# Patient Record
Sex: MALE | Race: WHITE | HISPANIC OR LATINO | Employment: OTHER | URBAN - METROPOLITAN AREA
[De-identification: names, ages, dates, MRNs, and addresses within clinical notes are randomized per-mention and may not be internally consistent; named-entity substitution may affect disease eponyms.]

---

## 2017-03-17 ENCOUNTER — APPOINTMENT (OUTPATIENT)
Dept: RADIOLOGY | Facility: MEDICAL CENTER | Age: 72
DRG: 603 | End: 2017-03-17
Attending: EMERGENCY MEDICINE
Payer: MEDICARE

## 2017-03-17 ENCOUNTER — HOSPITAL ENCOUNTER (INPATIENT)
Facility: MEDICAL CENTER | Age: 72
LOS: 2 days | DRG: 603 | End: 2017-03-19
Attending: EMERGENCY MEDICINE | Admitting: INTERNAL MEDICINE
Payer: MEDICARE

## 2017-03-17 ENCOUNTER — HOSPITAL ENCOUNTER (EMERGENCY)
Facility: MEDICAL CENTER | Age: 72
End: 2017-03-17
Payer: MEDICARE

## 2017-03-17 ENCOUNTER — RESOLUTE PROFESSIONAL BILLING HOSPITAL PROF FEE (OUTPATIENT)
Dept: HOSPITALIST | Facility: MEDICAL CENTER | Age: 72
End: 2017-03-17
Payer: MEDICARE

## 2017-03-17 VITALS
HEART RATE: 88 BPM | SYSTOLIC BLOOD PRESSURE: 169 MMHG | TEMPERATURE: 98.8 F | RESPIRATION RATE: 16 BRPM | OXYGEN SATURATION: 94 % | DIASTOLIC BLOOD PRESSURE: 107 MMHG

## 2017-03-17 PROBLEM — L03.119 CELLULITIS OF WRIST: Status: ACTIVE | Noted: 2017-03-17

## 2017-03-17 LAB
ALBUMIN SERPL BCP-MCNC: 4.3 G/DL (ref 3.2–4.9)
ALBUMIN/GLOB SERPL: 1.5 G/DL
ALP SERPL-CCNC: 96 U/L (ref 30–99)
ALT SERPL-CCNC: 28 U/L (ref 2–50)
ANION GAP SERPL CALC-SCNC: 7 MMOL/L (ref 0–11.9)
AST SERPL-CCNC: 27 U/L (ref 12–45)
BASOPHILS # BLD AUTO: 0.5 % (ref 0–1.8)
BASOPHILS # BLD: 0.04 K/UL (ref 0–0.12)
BILIRUB SERPL-MCNC: 0.9 MG/DL (ref 0.1–1.5)
BUN SERPL-MCNC: 19 MG/DL (ref 8–22)
CALCIUM SERPL-MCNC: 9.5 MG/DL (ref 8.4–10.2)
CHLORIDE SERPL-SCNC: 101 MMOL/L (ref 96–112)
CO2 SERPL-SCNC: 30 MMOL/L (ref 20–33)
CREAT SERPL-MCNC: 0.89 MG/DL (ref 0.5–1.4)
CRP SERPL HS-MCNC: 0.22 MG/DL (ref 0–0.75)
EOSINOPHIL # BLD AUTO: 0.08 K/UL (ref 0–0.51)
EOSINOPHIL NFR BLD: 1 % (ref 0–6.9)
ERYTHROCYTE [DISTWIDTH] IN BLOOD BY AUTOMATED COUNT: 41.5 FL (ref 35.9–50)
ERYTHROCYTE [SEDIMENTATION RATE] IN BLOOD BY WESTERGREN METHOD: 7 MM/HOUR (ref 0–20)
GFR SERPL CREATININE-BSD FRML MDRD: >60 ML/MIN/1.73 M 2
GLOBULIN SER CALC-MCNC: 2.9 G/DL (ref 1.9–3.5)
GLUCOSE SERPL-MCNC: 99 MG/DL (ref 65–99)
HCT VFR BLD AUTO: 50.2 % (ref 42–52)
HGB BLD-MCNC: 17.4 G/DL (ref 14–18)
IMM GRANULOCYTES # BLD AUTO: 0.03 K/UL (ref 0–0.11)
IMM GRANULOCYTES NFR BLD AUTO: 0.4 % (ref 0–0.9)
LYMPHOCYTES # BLD AUTO: 1.55 K/UL (ref 1–4.8)
LYMPHOCYTES NFR BLD: 20.3 % (ref 22–41)
MCH RBC QN AUTO: 31.6 PG (ref 27–33)
MCHC RBC AUTO-ENTMCNC: 34.7 G/DL (ref 33.7–35.3)
MCV RBC AUTO: 91.3 FL (ref 81.4–97.8)
MONOCYTES # BLD AUTO: 0.64 K/UL (ref 0–0.85)
MONOCYTES NFR BLD AUTO: 8.4 % (ref 0–13.4)
NEUTROPHILS # BLD AUTO: 5.29 K/UL (ref 1.82–7.42)
NEUTROPHILS NFR BLD: 69.4 % (ref 44–72)
NRBC # BLD AUTO: 0 K/UL
NRBC BLD AUTO-RTO: 0 /100 WBC
PLATELET # BLD AUTO: 140 K/UL (ref 164–446)
PMV BLD AUTO: 11.6 FL (ref 9–12.9)
POTASSIUM SERPL-SCNC: 3.9 MMOL/L (ref 3.6–5.5)
PROT SERPL-MCNC: 7.2 G/DL (ref 6–8.2)
RBC # BLD AUTO: 5.5 M/UL (ref 4.7–6.1)
SODIUM SERPL-SCNC: 138 MMOL/L (ref 135–145)
URATE SERPL-MCNC: 4.8 MG/DL (ref 2.5–8.3)
WBC # BLD AUTO: 7.6 K/UL (ref 4.8–10.8)

## 2017-03-17 PROCEDURE — 700111 HCHG RX REV CODE 636 W/ 250 OVERRIDE (IP): Performed by: EMERGENCY MEDICINE

## 2017-03-17 PROCEDURE — 87040 BLOOD CULTURE FOR BACTERIA: CPT

## 2017-03-17 PROCEDURE — A9579 GAD-BASE MR CONTRAST NOS,1ML: HCPCS | Performed by: EMERGENCY MEDICINE

## 2017-03-17 PROCEDURE — 73110 X-RAY EXAM OF WRIST: CPT | Mod: LT

## 2017-03-17 PROCEDURE — 86140 C-REACTIVE PROTEIN: CPT

## 2017-03-17 PROCEDURE — 302449 STATCHG TRIAGE ONLY (STATISTIC)

## 2017-03-17 PROCEDURE — 770006 HCHG ROOM/CARE - MED/SURG/GYN SEMI*

## 2017-03-17 PROCEDURE — 700117 HCHG RX CONTRAST REV CODE 255: Performed by: EMERGENCY MEDICINE

## 2017-03-17 PROCEDURE — 700111 HCHG RX REV CODE 636 W/ 250 OVERRIDE (IP)

## 2017-03-17 PROCEDURE — 84550 ASSAY OF BLOOD/URIC ACID: CPT

## 2017-03-17 PROCEDURE — 73223 MRI JOINT UPR EXTR W/O&W/DYE: CPT | Mod: LT

## 2017-03-17 PROCEDURE — 85025 COMPLETE CBC W/AUTO DIFF WBC: CPT

## 2017-03-17 PROCEDURE — 36415 COLL VENOUS BLD VENIPUNCTURE: CPT

## 2017-03-17 PROCEDURE — 96365 THER/PROPH/DIAG IV INF INIT: CPT

## 2017-03-17 PROCEDURE — 700105 HCHG RX REV CODE 258

## 2017-03-17 PROCEDURE — 85652 RBC SED RATE AUTOMATED: CPT

## 2017-03-17 PROCEDURE — 99285 EMERGENCY DEPT VISIT HI MDM: CPT

## 2017-03-17 PROCEDURE — 99223 1ST HOSP IP/OBS HIGH 75: CPT | Mod: AI | Performed by: INTERNAL MEDICINE

## 2017-03-17 PROCEDURE — 80053 COMPREHEN METABOLIC PANEL: CPT

## 2017-03-17 PROCEDURE — 96375 TX/PRO/DX INJ NEW DRUG ADDON: CPT

## 2017-03-17 RX ORDER — ACETAMINOPHEN 325 MG/1
650 TABLET ORAL EVERY 6 HOURS PRN
Status: DISCONTINUED | OUTPATIENT
Start: 2017-03-17 | End: 2017-03-19 | Stop reason: HOSPADM

## 2017-03-17 RX ORDER — AMPICILLIN AND SULBACTAM 2; 1 G/1; G/1
3 INJECTION, POWDER, FOR SOLUTION INTRAMUSCULAR; INTRAVENOUS ONCE
Status: COMPLETED | OUTPATIENT
Start: 2017-03-17 | End: 2017-03-17

## 2017-03-17 RX ORDER — KETOROLAC TROMETHAMINE 30 MG/ML
15 INJECTION, SOLUTION INTRAMUSCULAR; INTRAVENOUS ONCE
Status: COMPLETED | OUTPATIENT
Start: 2017-03-17 | End: 2017-03-17

## 2017-03-17 RX ORDER — LORAZEPAM 2 MG/ML
0.5 INJECTION INTRAMUSCULAR EVERY 6 HOURS PRN
Status: DISCONTINUED | OUTPATIENT
Start: 2017-03-17 | End: 2017-03-19 | Stop reason: HOSPADM

## 2017-03-17 RX ORDER — ONDANSETRON 4 MG/1
4 TABLET, ORALLY DISINTEGRATING ORAL EVERY 4 HOURS PRN
Status: DISCONTINUED | OUTPATIENT
Start: 2017-03-17 | End: 2017-03-19 | Stop reason: HOSPADM

## 2017-03-17 RX ORDER — LORAZEPAM 1 MG/1
0.5 TABLET ORAL EVERY 6 HOURS PRN
Status: DISCONTINUED | OUTPATIENT
Start: 2017-03-17 | End: 2017-03-19 | Stop reason: HOSPADM

## 2017-03-17 RX ORDER — TEMAZEPAM 15 MG/1
15 CAPSULE ORAL NIGHTLY PRN
Status: DISCONTINUED | OUTPATIENT
Start: 2017-03-17 | End: 2017-03-19 | Stop reason: HOSPADM

## 2017-03-17 RX ORDER — ONDANSETRON 2 MG/ML
4 INJECTION INTRAMUSCULAR; INTRAVENOUS EVERY 4 HOURS PRN
Status: DISCONTINUED | OUTPATIENT
Start: 2017-03-17 | End: 2017-03-19 | Stop reason: HOSPADM

## 2017-03-17 RX ADMIN — AMPICILLIN SODIUM AND SULBACTAM SODIUM 3 G: 2; 1 INJECTION, POWDER, FOR SOLUTION INTRAMUSCULAR; INTRAVENOUS at 17:39

## 2017-03-17 RX ADMIN — VANCOMYCIN HYDROCHLORIDE: 1 INJECTION, POWDER, LYOPHILIZED, FOR SOLUTION INTRAVENOUS at 22:53

## 2017-03-17 RX ADMIN — KETOROLAC TROMETHAMINE 15 MG: 30 INJECTION, SOLUTION INTRAMUSCULAR; INTRAVENOUS at 18:14

## 2017-03-17 RX ADMIN — GADODIAMIDE 20 ML: 287 INJECTION INTRAVENOUS at 19:09

## 2017-03-17 ASSESSMENT — PATIENT HEALTH QUESTIONNAIRE - PHQ9
SUM OF ALL RESPONSES TO PHQ QUESTIONS 1-9: 0
1. LITTLE INTEREST OR PLEASURE IN DOING THINGS: NOT AT ALL
SUM OF ALL RESPONSES TO PHQ9 QUESTIONS 1 AND 2: 0
2. FEELING DOWN, DEPRESSED, IRRITABLE, OR HOPELESS: NOT AT ALL

## 2017-03-17 ASSESSMENT — LIFESTYLE VARIABLES
DO YOU DRINK ALCOHOL: NO
ALCOHOL_USE: NO
EVER_SMOKED: NEVER

## 2017-03-17 ASSESSMENT — PAIN SCALES - GENERAL
PAINLEVEL_OUTOF10: 8
PAINLEVEL_OUTOF10: 2
PAINLEVEL_OUTOF10: 6
PAINLEVEL_OUTOF10: 5
PAINLEVEL_OUTOF10: 1

## 2017-03-17 NOTE — ED NOTES
Blood samples have been collected while establishing IV access without any complications and transferred to lab.

## 2017-03-17 NOTE — IP AVS SNAPSHOT
3/19/2017          Andry Calles  [address] [facility]  [place] [address]    Dear Andry:    [facility] wants to ensure your discharge home is safe and you or your loved ones have had all your questions answered regarding your care after you leave the hospital.    You may receive a telephone call within two days of your discharge.  This call is to make certain you understand your discharge instructions as well as ensure we provided you with the best care possible during your stay with us.     The call will only last approximately 3-5 minutes and will be done by a nurse.    Once again, we want to ensure your discharge home is safe and that you have a clear understanding of any next steps in your care.  If you have any questions or concerns, please do not hesitate to contact us, we are here for you.  Thank you for choosing [facility] for your healthcare needs.    Sincerely,    Reji Medley    [facility]

## 2017-03-17 NOTE — ED PROVIDER NOTES
"ED Provider Note    CHIEF COMPLAINT  Chief Complaint   Patient presents with   • Hand Pain   • Wrist Pain       HPI  Andry Calles is a 71 y.o. male who presents for evaluation of left hand pain. The patient reports that yesterday he developed a \"twinge \"of pain on the dorsal aspect of the wrist on the left side. He does not think he recall any direct blood penetrating trauma. No recent scratches and abrasions or puncture wounds. He does have a cat but denies that he was bit or scratched recently. Over the past 12-24 hours he's had increasing redness and pain and swelling on the dorsal aspect of the left wrist starting the streak up the distal arm. Denies any numbness or weakness. No prior history of gout, rheumatoid arthritis inflammatory arthritis. He denies polyarthralgias. No IV drug use. He is retired physician    REVIEW OF SYSTEMS  See HPI for further details. No high fevers chills night sweats or weight loss All other systems are negative.     PAST MEDICAL HISTORY  History reviewed. No pertinent past medical history.  Noncontributory  FAMILY HISTORY  No history of bleeding disorder    SOCIAL HISTORY  Social History     Social History   • Marital Status:      Spouse Name: N/A   • Number of Children: N/A   • Years of Education: N/A     Social History Main Topics   • Smoking status: Never Smoker    • Smokeless tobacco: Never Used   • Alcohol Use: No   • Drug Use: No   • Sexual Activity: Not Asked     Other Topics Concern   • None     Social History Narrative   • None     Nonsmoker no alcohol no drugs  SURGICAL HISTORY  Past Surgical History   Procedure Laterality Date   • Other orthopedic surgery     • Other abdominal surgery         CURRENT MEDICATIONS  Patient takes some sort of prostate medication he does not recall the name    ALLERGIES  Allergies   Allergen Reactions   • Other Environmental        PHYSICAL EXAM  VITAL SIGNS: /82 mmHg  Pulse 76  Temp(Src) 37.2 °C (99 °F)  Resp 18  Ht " "1.829 m (6' 0.01\")  Wt 88.6 kg (195 lb 5.2 oz)  BMI 26.49 kg/m2  SpO2 98% Room air O2: 94    Constitutional: Well developed, Well nourished, No acute distress, Non-toxic appearance.   HENT: Normocephalic, Atraumatic, Bilateral external ears normal, Oropharynx moist, No oral exudates, Nose normal.   Eyes: PERRLA, EOMI, Conjunctiva normal, No discharge.   Neck: Normal range of motion, No tenderness, Supple, No stridor.   Cardiovascular: Normal heart rate, Normal rhythm, No murmurs, No rubs, No gallops.   Thorax & Lungs: Normal breath sounds, No respiratory distress, No wheezing, No chest tenderness.   Abdomen: Bowel sounds normal, Soft, No tenderness, No masses, No pulsatile masses.   Skin: Warm, Dry, No erythema, No rash.   Back: No tenderness, No CVA tenderness.   Extremities: Left upper extremities exam is notable for some tenderness and erythema noted on the dorsal aspect of the wrist. There is some erythema and tenderness on the hand as well no bony deformity. No palpable effusion there is possibly some lymphangitis streaking up the left forearm. No abrasions or obvious puncture wounds.   Neurologic: Alert & oriented x 3, Normal motor function, Normal sensory function, No focal deficits noted.   Psychiatric: Affect normal, Judgment normal, Mood normal.       RADIOLOGY/PROCEDURES  MR-WRIST WITH & W/O LEFT   Final Result         1. Superficial subcutaneous edema along the dorsum of the wrist without discrete fluid collection identified.      2. No abnormal signal in the bone to suggest osteomyelitis.      3. Small amount of fluid within the distal radioulnar joint and radiocarpal joint without significant enhancement, could be reactive.      DX-WRIST-COMPLETE 3+ LEFT   Final Result      No evidence of acute fracture or dislocation.               COURSE & MEDICAL DECISION MAKING  Pertinent Labs & Imaging studies reviewed. (See chart for details)    The patient presented here with significant hand redness warmth " and pain in the joint. Differential diagnosis included gout, cellulitis, septic joint, inflammatory arthritis. While he was here he had lymphangitis streaking up the arm highly suggestive of bacterial etiology. I was mostly concerned about septic joint. MRI did not do straight any large amount of reactive fluid to suggest a fulminant Cornell septic joint could not see a large enough pocket to perform joint aspiration. The patient had blood cultures performed. He is given broad-spectrum IV antibiotics. He be admitted to the hospital for IV antibiotics and observation    The patient was noted to be hypertensive here. He has no prior history of hypertension. He does not have any evidence of end organ ischemia. He has no chest pain or headache. Did not feel that this required urgent intervention but he will be referred back to his PCP for ongoing management       FINAL IMPRESSION  1. Left hand cellulitis      Electronically signed by: Bryson Klein, 3/17/2017 3:46 PM

## 2017-03-17 NOTE — IP AVS SNAPSHOT
Caption Data Access Code: [email or web address]  Expires: 4/18/2017  5:42 PM    Your email address is not on file at [facility].  Email Addresses are required for you to sign up for Caption Data, please contact [phone] to verify your personal information and to provide your email address prior to attempting to register for Caption Data.    Andry Calles  [address] [place] [address]  [place] [address]    Caption Data  A secure, online tool to manage your health information     [facility]’s Caption Data® is a secure, online tool that connects you to your personalized health information from the privacy of your home -- day or night - making it very easy for you to manage your healthcare. Once the activation process is completed, you can even access your medical information using the Caption Data fiona, which is available for free in the Apple Fiona store or Google Play store.     To learn more about Caption Data, visit [email or web address]    There are two levels of access available (as shown below):   My Chart Features  [facility] Primary Care Doctor [facility]  Specialists [facility]  Urgent  Care Non-[facility] Primary Care Doctor   Email your healthcare team securely and privately 24/7 X X X    Manage appointments: schedule your next appointment; view details of past/upcoming appointments X      Request prescription refills. X      View recent personal medical records, including lab and immunizations X X X X   View health record, including health history, allergies, medications X X X X   Read reports about your outpatient visits, procedures, consult and ER notes X X X X   See your discharge summary, which is a recap of your hospital and/or ER visit that includes your diagnosis, lab results, and care plan X X  X     How to register for Innobitst:  Once your e-mail address has been verified, follow the following steps to sign up for Innobitst.     1. Go to  [email or web address]  2. Click on the Sign Up Now box, which takes you to the New Member Sign Up page. You will  need to provide the following information:  a. Enter your Totsy Access Code exactly as it appears at the top of this page. (You will not need to use this code after you’ve completed the sign-up process. If you do not sign up before the expiration date, you must request a new code.)   b. Enter your date of birth.   c. Enter your home email address.   d. Click Submit, and follow the next screen’s instructions.  3. Create a Air Intelligencet ID. This will be your Totsy login ID and cannot be changed, so think of one that is secure and easy to remember.  4. Create a Totsy password. You can change your password at any time.  5. Enter your Password Reset Question and Answer. This can be used at a later time if you forget your password.   6. Enter your e-mail address. This allows you to receive e-mail notifications when new information is available in Totsy.  7. Click Sign Up. You can now view your health information.    For assistance activating your Totsy account, call [phone]

## 2017-03-17 NOTE — IP AVS SNAPSHOT
" <p align=\"LEFT\"><IMG SRC=\"//[email or web address]/blob$/[email or web address]\" alt=\"Image\" WIDTH=\"50%\" HEIGHT=\"200\" BORDER=\"\"></p>                   Name:Andry Calles  Medical Record Number:[id number]  CSN: [id number]    YOB: 1945   Age: 71 y.o.  Sex: male  HT:1.829 m (6' 0.01\") WT: 88.6 kg (195 lb 5.2 oz)          Admit Date: 3/17/2017     Discharge Date:   Today's Date: 3/19/2017  Attending Doctor:  Sloan Gamble D.O.                  Allergies:  Other environmental          Your appointments     Mar 23, 2017  9:40 AM   New Patient with Romero Marks PA-C   [facility]    [address]  [address]  [facility] [address] [phone]           Please bring Photo ID, Insurance Cards, All Medication Bottles and copies of any legal documents (such as Living Will, Power of ) If speaking a language besides English please bring an adult . Please arrive 30 minutes prior for check in and registration. You will be receiving a confirmation call a few days before your appointment from our automated call confirmation system.              Follow-up Information     1. Follow up with Pcp Pt States None In 2 weeks.    Specialty:  Family Medicine        2. Follow up with Anny Sousa M.D. In 2 weeks.    Specialty:  Infectious Disease    Contact information    [address]  [address]  [facility] [address] [phone]           Medication List      Take these Medications        Instructions    amoxicillin-clavulanate 875-125 MG Tabs   Commonly known as:  AUGMENTIN    Take 1 Tab by mouth 2 times a day for 10 days.   Dose:  1 Tab       loratadine 10 MG Tabs   Commonly known as:  CLARITIN    Take 10 mg by mouth 1 time daily as needed.   Dose:  10 mg       sulfamethoxazole-trimethoprim 800-160 MG tablet   Commonly known as:  BACTRIM DS    Take 1 Tab by mouth 2 times a day for 14 days.   Dose:  1 Tab       tamsulosin 0.4 MG capsule   Commonly known as:  FLOMAX    Take " 0.4 mg by mouth 2 times a day.   Dose:  0.4 mg

## 2017-03-17 NOTE — IP AVS SNAPSHOT
" Home Care Instructions                                                                                                                  Name:Andry Calles  Medical Record Number:[id number]  CSN: [id number]    YOB: 1945   Age: 71 y.o.  Sex: male  HT:1.829 m (6' 0.01\") WT: 88.6 kg (195 lb 5.2 oz)          Admit Date: 3/17/2017     Discharge Date:   Today's Date: 3/19/2017  Attending Doctor:  Sloan Gamble D.O.                  Allergies:  Other environmental            Discharge Instructions       Discharge Instructions    Discharged to home by car with friend. Discharged via wheelchair, hospital escort: Yes.  Special equipment needed: Not Applicable    Be sure to schedule a follow-up appointment with your primary care doctor or any specialists as instructed.     Discharge Plan:   Diet Plan: Discussed  Activity Level: Discussed  Confirmed Follow up Appointment: Patient to Call and Schedule Appointment  Confirmed Symptoms Management: Discussed  Medication Reconciliation Updated: Yes  Influenza Vaccine Indication: Patient Refuses    I understand that a diet low in cholesterol, fat, and sodium is recommended for good health. Unless I have been given specific instructions below for another diet, I accept this instruction as my diet prescription.   Other diet: regular    Special Instructions: None    · Is patient discharged on Warfarin / Coumadin?   No     · Is patient Post Blood Transfusion?  No    Depression / Suicide Risk    As you are discharged from this [facility] facility, it is important to learn how to keep safe from harming yourself.    Recognize the warning signs:  · Abrupt changes in personality, positive or negative- including increase in energy   · Giving away possessions  · Change in eating patterns- significant weight changes-  positive or negative  · Change in sleeping patterns- unable to sleep or sleeping all the time   · Unwillingness or inability to communicate  · Depression  · Unusual " sadness, discouragement and loneliness  · Talk of wanting to die  · Neglect of personal appearance   · Rebelliousness- reckless behavior  · Withdrawal from people/activities they love  · Confusion- inability to concentrate     If you or a loved one observes any of these behaviors or has concerns about self-harm, here's what you can do:  · Talk about it- your feelings and reasons for harming yourself  · Remove any means that you might use to hurt yourself (examples: pills, rope, extension cords, firearm)  · Get professional help from the community (Mental Health, Substance Abuse, psychological counseling)  · Do not be alone:Call your Safe Contact- someone whom you trust who will be there for you.  · Call your local CRISIS HOTLINE 750-3063 or 516-331-2236  · Call your local Children's Mobile Crisis Response Team [place] (349) 508-1169 or www.[employer]  · Call the toll free National Suicide Prevention Hotlines   · National Suicide Prevention Lifeline 940-663-ARKL (1614)  · National Hope Line Network 800-SUICIDE (186-4885)        Your appointments     Mar 23, 2017  9:40 AM   New Patient with Romero Marks PA-C   [facility]    [address]  [address]  [place] [address] [phone]           Please bring Photo ID, Insurance Cards, All Medication Bottles and copies of any legal documents (such as Living Will, Power of ) If speaking a language besides English please bring an adult . Please arrive 30 minutes prior for check in and registration. You will be receiving a confirmation call a few days before your appointment from our automated call confirmation system.              Follow-up Information     1. Follow up with Pcp Pt States None In 2 weeks.    Specialty:  Family Medicine        2. Follow up with Anny Sousa M.D. In 2 weeks.    Specialty:  Infectious Disease    Contact information    [address] [facility]  [address]  [place]  [place] [address]  [phone]           Discharge Medication Instructions:    Below are the medications your physician expects you to take upon discharge:    Review all your home medications and newly ordered medications with your doctor and/or pharmacist. Follow medication instructions as directed by your doctor and/or pharmacist.    Please keep your medication list with you and share with your physician.               Medication List      START taking these medications        Instructions    amoxicillin-clavulanate 875-125 MG Tabs   Commonly known as:  AUGMENTIN    Take 1 Tab by mouth 2 times a day for 10 days.   Dose:  1 Tab       sulfamethoxazole-trimethoprim 800-160 MG tablet   Commonly known as:  BACTRIM DS    Take 1 Tab by mouth 2 times a day for 14 days.   Dose:  1 Tab         CONTINUE taking these medications        Instructions    loratadine 10 MG Tabs   Commonly known as:  CLARITIN    Take 10 mg by mouth 1 time daily as needed.   Dose:  10 mg       tamsulosin 0.4 MG capsule   Last time this was given:  0.8 mg on 3/18/2017  7:22 PM   Commonly known as:  FLOMAX    Take 0.4 mg by mouth 2 times a day.   Dose:  0.4 mg         STOP taking these medications     doxycycline 100 MG Tabs   Commonly known as:  VIBRAMYCIN               Instructions           Diet / Nutrition:    Follow any diet instructions given to you by your doctor or the dietician, including how much salt (sodium) you are allowed each day.    If you are overweight, talk to your doctor about a weight reduction plan.    Activity:    Remain physically active following your doctor's instructions about exercise and activity.    Rest often.     Any time you become even a little tired or short of breath, SIT DOWN and rest.    Worsening Symptoms:    Report any of the following signs and symptoms to the doctor's office immediately:    *Pain of jaw, arm, or neck  *Chest pain not relieved by medication                               *Dizziness or loss of  consciousness  *Difficulty breathing even when at rest   *More tired than usual                                       *Bleeding drainage or swelling of surgical site  *Swelling of feet, ankles, legs or stomach                 *Fever (>100ºF)  *Pink or blood tinged sputum  *Weight gain (3lbs/day or 5lbs /week)           *Shock from internal defibrillator (if applicable)  *Palpitations or irregular heartbeats                *Cool and/or numb extremities    Stroke Awareness    Common Risk Factors for Stroke include:    Age  Atrial Fibrillation  Carotid Artery Stenosis  Diabetes Mellitus  Excessive alcohol consumption  High blood pressure  Overweight   Physical inactivity  Smoking    Warning signs and symptoms of a stroke include:    *Sudden numbness or weakness of the face, arm or leg (especially on one side of the body).  *Sudden confusion, trouble speaking or understanding.  *Sudden trouble seeing in one or both eyes.  *Sudden trouble walking, dizziness, loss of balance or coordination.Sudden severe headache with no known cause.    It is very important to get treatment quickly when a stroke occurs. If you experience any of the above warning signs, call 911 immediately.                   Disclaimer         Quit Smoking / Tobacco Use:    I understand the use of any tobacco products increases my chance of suffering from future heart disease or stroke and could cause other illnesses which may shorten my life. Quitting the use of tobacco products is the single most important thing I can do to improve my health. For further information on smoking / tobacco cessation call a Toll Free Quit Line at 1-421.539.7295 (*[employer]) or 1-960.787.1407 (American Lung Association) or you can access the web based program at www.lungusa.org.    [place] Tobacco Users Help Line:  (864) 743-7913       Toll Free: 1-327.552.1982  Quit Tobacco Program [facility] (841)047-9495    Crisis Hotline:    National  Crisis Hotline:  2-891-JTAABOZ or 1-439.831.1994    [place] Crisis Hotline:    1-251.378.6992 or 178-798-0784    Discharge Survey:   Thank you for choosing [facility]. We hope we did everything we could to make your hospital stay a pleasant one. You may be receiving a phone survey and we would appreciate your time and participation in answering the questions. Your input is very valuable to us in our efforts to improve our service to our patients and their families.        My signature on this form indicates that:    1. I have reviewed and understand the above information.  2. My questions regarding this information have been answered to my satisfaction.  3. I have formulated a plan with my discharge nurse to obtain my prescribed medications for home.                  Disclaimer         __________________________________                     __________       ________                       Patient Signature                                                 Date                    Time

## 2017-03-18 PROBLEM — N40.0 BPH (BENIGN PROSTATIC HYPERPLASIA): Status: ACTIVE | Noted: 2017-03-18

## 2017-03-18 LAB
APPEARANCE UR: CLEAR
BILIRUB UR QL STRIP.AUTO: NEGATIVE
COLOR UR: YELLOW
ERYTHROCYTE [DISTWIDTH] IN BLOOD BY AUTOMATED COUNT: 41.6 FL (ref 35.9–50)
GLUCOSE UR STRIP.AUTO-MCNC: NEGATIVE MG/DL
HCT VFR BLD AUTO: 44.4 % (ref 42–52)
HGB BLD-MCNC: 15.5 G/DL (ref 14–18)
KETONES UR STRIP.AUTO-MCNC: NEGATIVE MG/DL
LEUKOCYTE ESTERASE UR QL STRIP.AUTO: NEGATIVE
MCH RBC QN AUTO: 31.5 PG (ref 27–33)
MCHC RBC AUTO-ENTMCNC: 34.9 G/DL (ref 33.7–35.3)
MCV RBC AUTO: 90.2 FL (ref 81.4–97.8)
MICRO URNS: ABNORMAL
MUCOUS THREADS #/AREA URNS HPF: ABNORMAL /HPF
NITRITE UR QL STRIP.AUTO: NEGATIVE
PH UR STRIP.AUTO: 6 [PH]
PLATELET # BLD AUTO: 129 K/UL (ref 164–446)
PMV BLD AUTO: 12 FL (ref 9–12.9)
PROT UR QL STRIP: NEGATIVE MG/DL
PSA SERPL-MCNC: 9.29 NG/ML (ref 0–4)
RBC # BLD AUTO: 4.92 M/UL (ref 4.7–6.1)
RBC # URNS HPF: ABNORMAL /HPF
RBC UR QL AUTO: ABNORMAL
SP GR UR STRIP.AUTO: 1.01
WBC # BLD AUTO: 6.1 K/UL (ref 4.8–10.8)
WBC #/AREA URNS HPF: ABNORMAL /HPF

## 2017-03-18 PROCEDURE — 700105 HCHG RX REV CODE 258: Performed by: INTERNAL MEDICINE

## 2017-03-18 PROCEDURE — 700111 HCHG RX REV CODE 636 W/ 250 OVERRIDE (IP)

## 2017-03-18 PROCEDURE — 36415 COLL VENOUS BLD VENIPUNCTURE: CPT

## 2017-03-18 PROCEDURE — 770006 HCHG ROOM/CARE - MED/SURG/GYN SEMI*

## 2017-03-18 PROCEDURE — 700102 HCHG RX REV CODE 250 W/ 637 OVERRIDE(OP): Performed by: INTERNAL MEDICINE

## 2017-03-18 PROCEDURE — 700105 HCHG RX REV CODE 258

## 2017-03-18 PROCEDURE — 85027 COMPLETE CBC AUTOMATED: CPT

## 2017-03-18 PROCEDURE — A9270 NON-COVERED ITEM OR SERVICE: HCPCS | Performed by: INTERNAL MEDICINE

## 2017-03-18 PROCEDURE — 700111 HCHG RX REV CODE 636 W/ 250 OVERRIDE (IP): Performed by: INTERNAL MEDICINE

## 2017-03-18 PROCEDURE — 99232 SBSQ HOSP IP/OBS MODERATE 35: CPT | Performed by: INTERNAL MEDICINE

## 2017-03-18 PROCEDURE — 81001 URINALYSIS AUTO W/SCOPE: CPT

## 2017-03-18 PROCEDURE — 84153 ASSAY OF PSA TOTAL: CPT

## 2017-03-18 RX ORDER — TAMSULOSIN HYDROCHLORIDE 0.4 MG/1
0.4 CAPSULE ORAL 2 TIMES DAILY
COMMUNITY
End: 2023-06-06

## 2017-03-18 RX ORDER — DOXYCYCLINE HYCLATE 100 MG
100 TABLET ORAL 2 TIMES DAILY
Status: ON HOLD | COMMUNITY
Start: 2017-03-10 | End: 2017-03-19

## 2017-03-18 RX ORDER — LORATADINE 10 MG/1
10 TABLET ORAL
COMMUNITY
End: 2023-06-06

## 2017-03-18 RX ORDER — TAMSULOSIN HYDROCHLORIDE 0.4 MG/1
0.8 CAPSULE ORAL EVERY EVENING
Status: DISCONTINUED | OUTPATIENT
Start: 2017-03-18 | End: 2017-03-19 | Stop reason: HOSPADM

## 2017-03-18 RX ADMIN — AMPICILLIN SODIUM AND SULBACTAM SODIUM 3 G: 2; 1 INJECTION, POWDER, FOR SOLUTION INTRAMUSCULAR; INTRAVENOUS at 09:00

## 2017-03-18 RX ADMIN — TAMSULOSIN HYDROCHLORIDE 0.8 MG: 0.4 CAPSULE ORAL at 19:22

## 2017-03-18 RX ADMIN — AMPICILLIN SODIUM AND SULBACTAM SODIUM: 2; 1 INJECTION, POWDER, FOR SOLUTION INTRAMUSCULAR; INTRAVENOUS at 02:55

## 2017-03-18 RX ADMIN — AMPICILLIN SODIUM AND SULBACTAM SODIUM 3 G: 2; 1 INJECTION, POWDER, FOR SOLUTION INTRAMUSCULAR; INTRAVENOUS at 15:00

## 2017-03-18 RX ADMIN — AMPICILLIN SODIUM AND SULBACTAM SODIUM 3 G: 2; 1 INJECTION, POWDER, FOR SOLUTION INTRAMUSCULAR; INTRAVENOUS at 21:08

## 2017-03-18 RX ADMIN — VANCOMYCIN HYDROCHLORIDE 1300 MG: 10 INJECTION, POWDER, LYOPHILIZED, FOR SOLUTION INTRAVENOUS at 11:05

## 2017-03-18 RX ADMIN — VANCOMYCIN HYDROCHLORIDE 1300 MG: 10 INJECTION, POWDER, LYOPHILIZED, FOR SOLUTION INTRAVENOUS at 22:49

## 2017-03-18 ASSESSMENT — PAIN SCALES - GENERAL: PAINLEVEL_OUTOF10: 1

## 2017-03-18 ASSESSMENT — LIFESTYLE VARIABLES
EVER_SMOKED: NEVER
DO YOU DRINK ALCOHOL: NO
DO YOU DRINK ALCOHOL: NO

## 2017-03-18 ASSESSMENT — ENCOUNTER SYMPTOMS
ABDOMINAL PAIN: 0
COUGH: 0
FEVER: 0

## 2017-03-18 NOTE — PROGRESS NOTES
"Pharmacy Kinetics 71 y.o. male on vancomycin day # 2 3/18/2017    Currently on Vancomycin 1300 mg iv q12hr    Indication for Treatment: Possible septic arthritis, cellulitis    Pertinent history per medical record: Admitted on 3/17/2017 for swelling, redness, pain left wrist.  Other Hx: sinus and allergy problems, gout    Other antibiotics: Unasyn 3 gm IV Q6H    Allergies: Other environmental     Pertinent cultures to date:   3/17/17 Blood culture peripheral - Negative    Recent Labs      17   1550  17   0709   WBC  7.6  6.1   NEUTSPOLYS  69.40   --      Recent Labs      17   1550   BUN  19   CREATININE  0.89   ALBUMIN  4.3     No results for input(s): RAINAOUGH, VANCOPEAK, YOSEF in the last 72 hours.  Intake/Output Summary (Last 24 hours) at 17 1152  Last data filed at 17 0800   Gross per 24 hour   Intake    720 ml   Output      0 ml   Net    720 ml      Blood pressure 133/79, pulse 55, temperature 36.4 °C (97.5 °F), resp. rate 19, height 1.829 m (6' 0.01\"), weight 88.6 kg (195 lb 5.2 oz), SpO2 95 %. Temp (24hrs), Av.8 °C (98.2 °F), Min:36.3 °C (97.3 °F), Max:37.2 °C (99 °F)      A/P   1. Vancomycin 1300 mg IV Q12H to continue as ordered  2. Next vancomycin level: 3/19 1030  3. Goal trough: 12 - 16 mcg/ml    RICKEY Burroughs PharmD      "

## 2017-03-18 NOTE — CARE PLAN
Problem: Safety  Goal: Will remain free from falls  Outcome: PROGRESSING AS EXPECTED  Pt educated on fall risks and to call staff before getting out of bed. Fall precautions in place, hourly rounding implemented, call light in reach.    Problem: Knowledge Deficit  Goal: Knowledge of disease process/condition, treatment plan, diagnostic tests, and medications will improve  Outcome: PROGRESSING AS EXPECTED  Discussed POC with pt, answered all questions, educated pt on medications and side effects. Pt verbalized understanding.

## 2017-03-18 NOTE — PROGRESS NOTES
The Medication Reconciliation process has been completed by interviewing the patient    Allergies have been reviewed  Antibiotic use in 30 days -Doxy 100 BID started 3/10/17 for possible prostatitis.    Home Pharmacy:  [facility] [address]

## 2017-03-18 NOTE — H&P
CHIEF COMPLAINT:  Wrist pain.    HISTORY OF PRESENT ILLNESS:  This is a 71-year-old male with no chronic   medical problems other than BPH who presents with acute pain in the left   wrist.  He says he first noticed it while he was driving yesterday morning, he   noticed some pain over the distal ulnar plantar aspect of his left wrist   sharp pain.  He thought perhaps maybe he had injured it in some way, although   he did not recall injury, but he went home and applied some ice, which seemed   to alleviate his symptoms somewhat.  He then went to bed and woke up this   morning and his wife noticed that the dorsum of his left wrist and hand were   red and swollen.  Initially he thought this might have been related to the   application of the ice pack, but as it went on throughout the day, it got more   swollen and more tender and he subsequently presented here.  He has been   started on antibiotics and said it has lessened the pain somewhat.  Today, the   pain was worse than it was yesterday.  He denies any injury to that area.  He   has had no wounds or scratches.  He does have cats, but they have bee declawed.  He   also has dogs at home.  He denies any other pets or unusual hobbies.  He has   never had prior similar joint pains or swelling.  He denies any fever, chills   or sweats in the last couple of days.  His appetite has been good.  He has had   no nausea or vomiting.  No chest pain, no shortness of breath.  He notes that   today that his blood pressure is higher than it normally is, which he   attributes to pain.  He recently had blip in his PSA was placed on   doxycycline.  He has completed a full course of that.  He denies any dysuria   pre or post-doxycycline.  He has had no recent diarrhea, no recent upper   respiratory illness.  No prior history of any type of arthritic issues.    PAST MEDICAL HISTORY:  Positive for chronic sinus and allergy problems and   gout.    PAST SURGICAL HISTORY:  He has had 2  sinus surgeries, 2 surgeries on his lower   back, 1 surgery on his cervical spine.  He has had perforated diverticulum   and had a left hemicolectomy for that.    FAMILY MEDICAL HISTORY:  Negative.    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    CODE STATUS:  Full code.    Patient does not drink any alcohol.  He does not smoke.    PHYSICAL EXAMINATION:  GENERAL:  This is a pleasant, well appearing male.  He appears 10 years   younger than his stated age.  He is nontoxic, in no acute distress.  HEENT:  Pupils are equal and reactive.  Extraocular movements are intact.  His   oropharynx is clear and moist.  NECK:  Supple, no JVD and no bruits.  LUNGS:  Reveal excellent breath sounds in all fields.  HEART:  Regular rate and rhythm without appreciable murmurs, rubs or gallops.  ABDOMEN:  Soft, nontender, nondistended.  EXTREMITIES:  He has erythema and mild soft tissue swelling over the dorsal   aspect of his left wrist and proximal hand.  There are some skin   irregularities consistent with solar keratosis, but there is really no obvious   break in skin, scratches, eschars or other injuries.  He has pain mostly with   dorsiflexion that causes 6/10 pain and  4/10 pain and range of motion in   his fingers is not affected.  He has well demarcated, intense hyperemia.    Extremities, otherwise, without any clubbing, cyanosis or edema.  NEUROLOGIC:  He is grossly intact.    LABORATORY STUDIES:  His sed rate is 7.  CBC, he has white count of 7.6, H and   H 17.4 and 50.2, platelet count is 140.  I have no priors.  Chemistry profile   is 100% within normal limits.  C-reactive protein 0.22.  Diagnostic x-rays of   the wrist showed no fracture or dislocation.  MRI of the wrist shows   superficial subcutaneous edema around the dorsum of the wrist without discrete   fluid collection.  There is no abnormal signal in the bone  suggestive of   osteo.  There is a small amount of fluid within the distal radial ulnar joint   and radiocarpal joint without significant enhancement, felt to be reactive,   this is the area where he notes most of his pain.    ASSESSMENT:  Acute cellulitis, possible early septic arthritis of the left   wrist with no obvious risk factors.  Patient will be started empirically on   Unasyn and vancomycin.  Preliminary inflammatory labs are negative.  Blood   cultures are pending.  Patient will be admitted.  We will monitor for   improvement and await results of the studies.  Should his range of motion   worsen or any other clinical signs worsen, we can reimage the wrist to see if   there is enough fluid to tap at that time.       ____________________________________     MD CHASE MASCORRO / NTS    DD:  03/17/2017 20:49:13  DT:  03/17/2017 21:36:41    D#:  [id number]  Job#:  [id number]

## 2017-03-18 NOTE — PROGRESS NOTES
Pt arrived on unit from ER. Resting in hospital bed, no signs of distress VSS. Discussed POC and answered all questions, educated pt call before getting out of bed using call light. Call light personal belongings and urinal at reach, will monitor.

## 2017-03-18 NOTE — PROGRESS NOTES
Patient sitting in the chair at the time of assessment. VSS. Patient alert and oriented x 4. Patient denied having any pain at this time. Left hand/arm/wrist area reddened and slightly warm to the touch. Slight edema noted. Patient stated it felt better this morning. Still awaiting blood culture results. IV antibiotics being taken. Bed locked and in the lowest position. Callbell w/in reach of the patient at all times. Plan of care discussed with patient and patient verbalized understanding. Patient refused lovenox injection this morning. Will continue to monitor patient closely for any changes in condition.

## 2017-03-19 ENCOUNTER — APPOINTMENT (OUTPATIENT)
Dept: RADIOLOGY | Facility: MEDICAL CENTER | Age: 72
DRG: 603 | End: 2017-03-19
Attending: INTERNAL MEDICINE
Payer: MEDICARE

## 2017-03-19 VITALS
TEMPERATURE: 97.8 F | DIASTOLIC BLOOD PRESSURE: 75 MMHG | HEART RATE: 62 BPM | SYSTOLIC BLOOD PRESSURE: 139 MMHG | RESPIRATION RATE: 18 BRPM | OXYGEN SATURATION: 93 % | HEIGHT: 72 IN | BODY MASS INDEX: 26.46 KG/M2 | WEIGHT: 195.33 LBS

## 2017-03-19 LAB
ALBUMIN SERPL BCP-MCNC: 3.4 G/DL (ref 3.2–4.9)
BASOPHILS # BLD AUTO: 0.4 % (ref 0–1.8)
BASOPHILS # BLD: 0.03 K/UL (ref 0–0.12)
BUN SERPL-MCNC: 15 MG/DL (ref 8–22)
CALCIUM SERPL-MCNC: 9 MG/DL (ref 8.4–10.2)
CHLORIDE SERPL-SCNC: 108 MMOL/L (ref 96–112)
CO2 SERPL-SCNC: 30 MMOL/L (ref 20–33)
CREAT SERPL-MCNC: 1 MG/DL (ref 0.5–1.4)
EOSINOPHIL # BLD AUTO: 0.14 K/UL (ref 0–0.51)
EOSINOPHIL NFR BLD: 2.1 % (ref 0–6.9)
ERYTHROCYTE [DISTWIDTH] IN BLOOD BY AUTOMATED COUNT: 42.6 FL (ref 35.9–50)
GFR SERPL CREATININE-BSD FRML MDRD: >60 ML/MIN/1.73 M 2
GLUCOSE SERPL-MCNC: 119 MG/DL (ref 65–99)
HCT VFR BLD AUTO: 46.2 % (ref 42–52)
HGB BLD-MCNC: 15.9 G/DL (ref 14–18)
IMM GRANULOCYTES # BLD AUTO: 0.03 K/UL (ref 0–0.11)
IMM GRANULOCYTES NFR BLD AUTO: 0.4 % (ref 0–0.9)
LYMPHOCYTES # BLD AUTO: 1.38 K/UL (ref 1–4.8)
LYMPHOCYTES NFR BLD: 20.7 % (ref 22–41)
MCH RBC QN AUTO: 31.8 PG (ref 27–33)
MCHC RBC AUTO-ENTMCNC: 34.4 G/DL (ref 33.7–35.3)
MCV RBC AUTO: 92.4 FL (ref 81.4–97.8)
MONOCYTES # BLD AUTO: 0.64 K/UL (ref 0–0.85)
MONOCYTES NFR BLD AUTO: 9.6 % (ref 0–13.4)
NEUTROPHILS # BLD AUTO: 4.45 K/UL (ref 1.82–7.42)
NEUTROPHILS NFR BLD: 66.8 % (ref 44–72)
NRBC # BLD AUTO: 0 K/UL
NRBC BLD AUTO-RTO: 0 /100 WBC
PHOSPHATE SERPL-MCNC: 3.9 MG/DL (ref 2.5–4.5)
PLATELET # BLD AUTO: 125 K/UL (ref 164–446)
PMV BLD AUTO: 11.7 FL (ref 9–12.9)
POTASSIUM SERPL-SCNC: 4.7 MMOL/L (ref 3.6–5.5)
RBC # BLD AUTO: 5 M/UL (ref 4.7–6.1)
SODIUM SERPL-SCNC: 145 MMOL/L (ref 135–145)
VANCOMYCIN TROUGH SERPL-MCNC: 12.1 UG/ML (ref 10–20)
WBC # BLD AUTO: 6.7 K/UL (ref 4.8–10.8)

## 2017-03-19 PROCEDURE — 700105 HCHG RX REV CODE 258: Performed by: INTERNAL MEDICINE

## 2017-03-19 PROCEDURE — 93971 EXTREMITY STUDY: CPT | Mod: RT

## 2017-03-19 PROCEDURE — 99239 HOSP IP/OBS DSCHRG MGMT >30: CPT | Performed by: INTERNAL MEDICINE

## 2017-03-19 PROCEDURE — 80069 RENAL FUNCTION PANEL: CPT

## 2017-03-19 PROCEDURE — 700111 HCHG RX REV CODE 636 W/ 250 OVERRIDE (IP): Performed by: INTERNAL MEDICINE

## 2017-03-19 PROCEDURE — 80202 ASSAY OF VANCOMYCIN: CPT

## 2017-03-19 PROCEDURE — 36415 COLL VENOUS BLD VENIPUNCTURE: CPT

## 2017-03-19 PROCEDURE — 85025 COMPLETE CBC W/AUTO DIFF WBC: CPT

## 2017-03-19 RX ORDER — SULFAMETHOXAZOLE AND TRIMETHOPRIM 800; 160 MG/1; MG/1
1 TABLET ORAL 2 TIMES DAILY
Qty: 28 TAB | Refills: 0 | Status: SHIPPED | OUTPATIENT
Start: 2017-03-19 | End: 2017-04-02

## 2017-03-19 RX ORDER — AMOXICILLIN AND CLAVULANATE POTASSIUM 875; 125 MG/1; MG/1
1 TABLET, FILM COATED ORAL 2 TIMES DAILY
Qty: 20 TAB | Refills: 0 | Status: SHIPPED | OUTPATIENT
Start: 2017-03-19 | End: 2017-03-29

## 2017-03-19 RX ADMIN — ENOXAPARIN SODIUM 40 MG: 40 INJECTION, SOLUTION INTRAVENOUS; SUBCUTANEOUS at 15:00

## 2017-03-19 RX ADMIN — VANCOMYCIN HYDROCHLORIDE 1300 MG: 10 INJECTION, POWDER, LYOPHILIZED, FOR SOLUTION INTRAVENOUS at 12:04

## 2017-03-19 RX ADMIN — AMPICILLIN SODIUM AND SULBACTAM SODIUM 3 G: 2; 1 INJECTION, POWDER, FOR SOLUTION INTRAMUSCULAR; INTRAVENOUS at 09:00

## 2017-03-19 RX ADMIN — AMPICILLIN SODIUM AND SULBACTAM SODIUM 3 G: 2; 1 INJECTION, POWDER, FOR SOLUTION INTRAMUSCULAR; INTRAVENOUS at 03:11

## 2017-03-19 RX ADMIN — AMPICILLIN SODIUM AND SULBACTAM SODIUM 3 G: 2; 1 INJECTION, POWDER, FOR SOLUTION INTRAMUSCULAR; INTRAVENOUS at 15:00

## 2017-03-19 NOTE — PROGRESS NOTES
"Assumed care of patient at 1900.  Patient is alert and oriented, resting in bed with his street clothes on waiting for a visit from his family.  Patient c/o bladder pressure and has concerns of bladder retention r/t prostate enlargement.  Patient asking for prn order for catheter because he is afraid of issues with bladder retention.  Bladder scanned for 76ml and patient states \"I am calm now\".  Patient continues to void clear yellow urine into urinal and states \"I am voiding less and less\".  Flomax dose from home reordered.  Will monitor closely.  "

## 2017-03-19 NOTE — CONSULTS
ADULT  INFECTIOUS DISEASES INPATIENT CONSULT NOTE     Date of Service: 03/19/17    Consult Requested By: Sloan Gamble D.O.    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 He states he was driving his daughter to school and he noted acute onset of radiocarpal pain on his left wrist while driving. He denied any prior skin rash, skin breakdown, trauma or injury. When he arrived home, he put ice on his left wrist but woke up the following morning with erythema and edema. He noted the erythema spreading to the digits. He denied any fevers or chills. He states he was on doxycycline for 2 weeks prescribed by his urologist for prior urinary retention and possible prostatitis. This is is second course of doxycycline; his first course was in December when he developed acute onset urinary retention s/p singh catheter. Of note, he recently traveled to [facility] for wok but was feeling well at that time. He denies any abdominal pain, diarrhea, or dysuria. On presentation, he was afebrile without leukocytosis. MRI of the left wrist revealed subcutaneous edema without any fluid collection however there was a small amount of fluid within the distal radioulnar joint and radiocarpal joint without significant enhancement too small for drainage. He was started on vancomycin and unasyn with clinical improvement. ID service was consulted for further abx recommendations.    Review Of Systems:  ROS are negative except as noted above in the HPI.    PMH:   H/o sinusitis  BPH  H/o Urinary retention      PSH:  Past Surgical History   Procedure Laterality Date   • Other orthopedic surgery     • Other abdominal surgery     Sinus surgery  Back surgery    FAMILY HX:  Negative for DM2, heart disease or cancer    SOCIAL HX:  Social History     Social History   •  Marital Status:      Spouse Name: N/A   • Number of Children: N/A   • Years of Education: N/A     Occupational History   • Not on file.     Social History Main Topics   • Smoking status: Never Smoker    • Smokeless tobacco: Never Used   • Alcohol Use: No   • Drug Use: No   • Sexual Activity: Not on file     Other Topics Concern   • Not on file     Social History Narrative   • No narrative on file     History   Smoking status   • Never Smoker    Smokeless tobacco   • Never Used     History   Alcohol Use No       Allergies/Intolerances:  Allergies   Allergen Reactions   • Other Environmental        History reviewed with the patient    Other Current Medications:    Current facility-administered medications:   •  vancomycin 1,300 mg in  mL IVPB, 15 mg/kg, Intravenous, Q12HR, Citlaly Gerard M.D., Last Rate: 166.7 mL/hr at 03/19/17 1204, 1,300 mg at 03/19/17 1204  •  tamsulosin (FLOMAX) capsule 0.8 mg, 0.8 mg, Oral, Q EVENING, Sloan Gamble D.O., 0.8 mg at 03/18/17 1922  •  MD ALERT... vancomycin per pharmacy protocol, , Other, PRN, Citlaly Gerard M.D.  •  ampicillin/sulbactam (UNASYN) 3 g in  mL IVPB, 3 g, Intravenous, Q6HRS, Citlaly Gerard M.D., Stopped at 03/19/17 0930  •  enoxaparin (LOVENOX) inj 40 mg, 40 mg, Subcutaneous, DAILY, Citlaly Gerard M.D., 40 mg at 03/18/17 0900  •  ondansetron (ZOFRAN) syringe/vial injection 4 mg, 4 mg, Intravenous, Q4HRS PRN, Citlaly Gerard M.D.  •  ondansetron (ZOFRAN ODT) dispertab 4 mg, 4 mg, Oral, Q4HRS PRN, Citlaly Gerard M.D.  •  lorazepam (ATIVAN) tablet 0.5 mg, 0.5 mg, Oral, Q6HRS PRN **OR** lorazepam (ATIVAN) injection 0.5 mg, 0.5 mg, Intravenous, Q6HRS PRN, Citlaly Gerard M.D.  •  temazepam (RESTORIL) capsule 15 mg, 15 mg, Oral, HS PRN, Citlaly Gerard M.D.  •  acetaminophen (TYLENOL) tablet 650 mg, 650 mg, Oral, Q6HRS PRN, Citlaly Gerard M.D.  [unfilled]    Most Recent Vital Signs:  BP  "141/76 mmHg  Pulse 58  Temp(Src) 36.4 °C (97.5 °F)  Resp 19  Ht 1.829 m (6' 0.01\")  Wt 88.6 kg (195 lb 5.2 oz)  BMI 26.49 kg/m2  SpO2 92%  Temp  Av.7 °C (98 °F)  Min: 36.3 °C (97.3 °F)  Max: 37.2 °C (99 °F)    Physical Exam:  General: well-appearing, no acute distress  HEENT: sclera anicteric, PERRL, EOMI, MMM, no oral lesions  Neck: supple, no lymphadenopathy  Chest: CTAB, no r/r/w, normal work of breathing.  Cardiac: RRR, normal S1 S2, no m/r/g   Abdomen: + bowel sounds, soft, non-tender, non-distended, no HSM  Extremities: WWP, no edema, 2+ pulses  Skin: Left hand with resolving erythema from demarcated line. No edema  Neuro: Alert and oriented times 3, non-focal exam    Pertinent Lab Results:  Recent Labs      17   1550  17   0709  17   0454   WBC  7.6  6.1  6.7      Recent Labs      17   1550  17   0709  17   0454   HEMOGLOBIN  17.4  15.5  15.9   HEMATOCRIT  50.2  44.4  46.2   MCV  91.3  90.2  92.4   MCH  31.6  31.5  31.8   PLATELETCT  140*  129*  125*         Recent Labs      17   1550  17   0454   SODIUM  138  145   POTASSIUM  3.9  4.7   CHLORIDE  101  108   CO2  30  30   CREATININE  0.89  1.00        Recent Labs      17   1550  17   0454   ALBUMIN  4.3  3.4        Pertinent Micro:  Results     Procedure Component Value Units Date/Time    URINALYSIS [[id number]]  (Abnormal) Collected:  17 1424    Order Status:  Completed Specimen Information:  Urine from Urine, Clean Catch Updated:  17 1542     Micro Urine Req Microscopic      Color Yellow      Character Clear      Specific Gravity 1.010      Ph 6.0      Glucose Negative mg/dL      Ketones Negative mg/dL      Protein Negative mg/dL      Bilirubin Negative      Nitrite Negative      Leukocyte Esterase Negative      Occult Blood Trace (A)     Narrative:      Collected By:[id number] KIMBERLI RICO    BLOOD CULTURE x2 [[id number]] Collected:  17 1730    Order Status:  " "Completed Specimen Information:  Blood from Peripheral Updated:  03/18/17 0821     Significant Indicator NEG      Source BLD      Site Peripheral      Blood Culture --      Result:        No Growth    Note: Blood cultures are incubated for 5 days and  are monitored continuously.Positive blood cultures  are called to the RN and reported as soon as  they are identified.      Narrative:      Per Hospital Policy: Only change Specimen Src: to \"Line\" if  specified by physician order.    BLOOD CULTURE x2 [[id number]]     Order Status:  Sent Specimen Information:  Blood from Peripheral         BLOOD CULTURE   Date Value Ref Range Status   03/17/2017   Preliminary    No Growth    Note: Blood cultures are incubated for 5 days and  are monitored continuously.Positive blood cultures  are called to the RN and reported as soon as  they are identified.          Studies: MRI left wrist reviewed with results noted in the HPI    IMPRESSION:   1. Left wrist cellulitis, unclear source  2. 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 Source of his infection is unclear given no prior skin rash, skin breakdown or recent trauma/injury. He is not a diabetic. He has responded clinically well with broad spectrum abx. Will discontinue vancomycin as no evidence of MRSA. Continue unaysn and transition to PO augmentin to complete a 10 day total course of abx. Plan for discharge home today. No need to follow up in the clinic. I gave my business card to the patient and instructed him to call our office if he has any issues. Patient verbalized understanding. OK to discharge home from ID perspective.      Discussed with IM. ID will sign off.     Anny Sousa M.D.      "

## 2017-03-19 NOTE — PROGRESS NOTES
Vancomycin and Unasyn continues as scheduled for cellulitis of left forearm.  Redness outlined to left hand and appears to be improving.  Patient has no further c/o at this time, resting in bed with call light in reach.  Hourly rounding continues.

## 2017-03-19 NOTE — PROGRESS NOTES
Hospital Medicine Progress Note, Adult, Complex               Author: Sloan Gamble Date & Time created: 3/18/2017  7:30 PM     Interval History:  Patient presented with abrupt onset of left wrist swelling and redness.  3/18/17 -- patient reports that he left wrist is better today.    Review of Systems:  Review of Systems   Constitutional: Negative for fever.   Respiratory: Negative for cough.    Cardiovascular: Negative for chest pain.   Gastrointestinal: Negative for abdominal pain.   Genitourinary: Positive for frequency.        History of bph       Physical Exam:  Physical Exam   Constitutional: He is oriented to person, place, and time. He appears well-developed and well-nourished. No distress.   HENT:   Head: Normocephalic and atraumatic.   Mouth/Throat: No oropharyngeal exudate.   Eyes: Conjunctivae and EOM are normal. Pupils are equal, round, and reactive to light. Right eye exhibits no discharge. Left eye exhibits no discharge.   Neck: Normal range of motion. Neck supple. No JVD present.   Cardiovascular: Normal rate, regular rhythm and normal heart sounds.    Pulmonary/Chest: Effort normal and breath sounds normal. No respiratory distress.   Abdominal: Soft. He exhibits no distension. There is no rebound.   Musculoskeletal: Normal range of motion.   Neurological: He is alert and oriented to person, place, and time.   Skin: He is not diaphoretic.   Erythema on dorsal lateral surface of left wrist and extend to left hand.   Psychiatric: He has a normal mood and affect.       Labs:        Invalid input(s): [id number]      Recent Labs      03/17/17   1550   SODIUM  138   POTASSIUM  3.9   CHLORIDE  101   CO2  30   BUN  19   CREATININE  0.89   CALCIUM  9.5     Recent Labs      03/17/17   1550   ALTSGPT  28   ASTSGOT  27   ALKPHOSPHAT  96   TBILIRUBIN  0.9   GLUCOSE  99     Recent Labs      03/17/17   1550  03/18/17   0709   RBC  5.50  4.92   HEMOGLOBIN  17.4  15.5   HEMATOCRIT  50.2  44.4   PLATELETCT  140*   129*     Recent Labs      17   1550  17   0709   WBC  7.6  6.1   NEUTSPOLYS  69.40   --    LYMPHOCYTES  20.30*   --    MONOCYTES  8.40   --    EOSINOPHILS  1.00   --    BASOPHILS  0.50   --    ASTSGOT  27   --    ALTSGPT  28   --    ALKPHOSPHAT  96   --    TBILIRUBIN  0.9   --            Hemodynamics:  Temp (24hrs), Av.6 °C (97.9 °F), Min:36.3 °C (97.3 °F), Max:37.2 °C (99 °F)  Temperature: 36.4 °C (97.5 °F)  Pulse  Av.5  Min: 55  Max: 80   Blood Pressure : 133/79 mmHg     Respiratory:    Respiration: 19, Pulse Oximetry: 95 %           Fluids:    Intake/Output Summary (Last 24 hours) at 17 1930  Last data filed at 17 1633   Gross per 24 hour   Intake   1440 ml   Output   1550 ml   Net   -110 ml        GI/Nutrition:  Orders Placed This Encounter   Procedures   • DIET ORDER     Standing Status: Standing      Number of Occurrences: 1      Standing Expiration Date:      Order Specific Question:  Diet:     Answer:  Regular [1]     Medical Decision Making, by Problem:  Active Hospital Problems    Diagnosis   • *Cellulitis of wrist [L03.119]   • BPH (benign prostatic hyperplasia) [N40.0]   -- Continue current antibiotic. F/U blood cultures.  Patient denies any recent injury, cat bites, or scratches.  -- Patient reported that he recently was on doxycyline for treatment of presumed prostatitis as patient had elevated PSA level.  -- resume flomax.  -- check UA  Labs reviewed and Medications reviewed        DVT Prophylaxis: Not indicated at this time, ambulatory

## 2017-03-19 NOTE — CARE PLAN
Problem: Venous Thromboembolism (VTW)/Deep Vein Thrombosis (DVT) Prevention:  Goal: Patient will participate in Venous Thrombosis (VTE)/Deep Vein Thrombosis (DVT)Prevention Measures  Outcome: PROGRESSING AS EXPECTED    03/18/17 0800 03/18/17 1952   OTHER   Risk Assessment Score --  3   VTE RISK --  High   Pharmacologic Prophylaxis Used LMWH: Enoxaparin(Lovenox)  (patient refused lovenox injection this morning) --    Lovenox ordered but patient refusing.  Patient is up walking frequently with steady gait.    Problem: Pain Management  Goal: Pain level will decrease to patient’s comfort goal  Outcome: PROGRESSING AS EXPECTED  Patient has no c/o pain this shift.

## 2017-03-19 NOTE — PROGRESS NOTES
"Pharmacy Kinetics 71 y.o. male on vancomycin day # 3 3/19/2017    Currently on Vancomycin 1300 mg iv q12hr    Indication for Treatment: Cellulitis of wrist    Pertinent history per medical record: Admitted on 3/17/2017 for swelling, redness, pain left wrist.  Other Hx: sinus and allergy problems    Other antibiotics: Unasyn 3 gm IV Q6H    Allergies: Other environmental     Pertinent cultures to date:   3/17/17 Blood culture peripheral - Neg    Recent Labs      17   1550  17   0709  17   0454   WBC  7.6  6.1  6.7   NEUTSPOLYS  69.40   --   66.80     Recent Labs      17   1550  17   0454   BUN  19  15   CREATININE  0.89  1.00   ALBUMIN  4.3  3.4     Recent Labs      17   1036   VANCOTROUGH  12.1     Intake/Output Summary (Last 24 hours) at 17 1119  Last data filed at 17 0800   Gross per 24 hour   Intake   2130 ml   Output   1600 ml   Net    530 ml      Blood pressure 141/76, pulse 58, temperature 36.4 °C (97.5 °F), resp. rate 19, height 1.829 m (6' 0.01\"), weight 88.6 kg (195 lb 5.2 oz), SpO2 92 %. Temp (24hrs), Av.4 °C (97.6 °F), Min:36.4 °C (97.5 °F), Max:36.6 °C (97.8 °F)      A/P   1. Vancomycin dose change: continue Vancomycin 1300 mg IV Q12H  2. Next vancomycin level: 2-3 days  3. Goal trough: 12 - 16 mcg/ml  4. Comments: Vancomycin trough is therapeutic at 12 mcg/ml.    RICKEY Burroughs PharmD      "

## 2017-03-20 NOTE — DISCHARGE SUMMARY
DATE OF ADMISSION:  2017    DATE OF DISCHARGE:  2017    DISCHARGE DIAGNOSES:  Includin.  Left arm, hand and wrist cellulitis.  2.  Benign prostate hypertrophy.  3.  Superficial venous thrombus within the right basilic vein.    CONSULTANT ON THE CASE:  Including infectious disease.    CONSULTANT:  Anny Sousa MD    PERTINENT IMAGING AND PROCEDURES HERE:  Patient had the following imaging done   here including a left wrist 3-view x-ray  showed no evidence of any acute   fractures or dislocations and patient's MRI of the wrist with and without   contrast on 2017 and the finding was superficial subcutaneous edema of   the dorsum of the wrist without discrete fluid collection identified.  No   abnormal signal in the bone to suggest osteomyelitis.  Patient has small   amount of fluid within the distal radial ulnar joint and radiocarpal joint   without significant enhancement.  Please note, patient   also had a duplex ultrasound of the right upper extremity and these findings   were negative for DVT, but patient had superficial venous thrombus within the   right basilic vein.  Urinalysis done does not indicate infections with   negative nitrite, leukocyte esterase and blood culture collected was negative   for infection preliminary.  Patient does have elevated PSA level of 9.29 and   patient's CRP was 0.22.  Uric acid was 4.8.    SUMMARY OF HOSPITAL COURSE:  Patient is a 71-year-old male who came in the   hospital with complaint of sudden onset of left wrist swelling, which also   extended partly into the hand and in the dorsal surface.  Patient had multiple   imaging done in the ER including MRI, but there was not significant fluid for   aspiration and patient was treated for presumed cellulitis, but   interestingly, patient does not have any cuts or any recent laceration or any   trauma recently.  Patient does have a cat, but denies any having cat scratches   or cat bite.  On examination, the  patient's left upper extremity as well as   the patient's body does not show any puncture site or any bite charles, so the   source is unclear and given the unclear etiology of the patient's cellulitis,   they consulted infectious disease, Dr. Sousa to come assess the patient.  Dr. Sousa does not recommend any further workup.  Patient did also complain of   right upper extremity swelling at the site where IV was inserted and imaging   done showing the patient does not have DVT, does have superficial basilic vein   thrombus.  In any case, patient was on Unasyn and vancomycin.  Patient did   have significant reduction of the cellulitis area and the patient was in   stable condition.  Patient's blood cultures remained negative for 48 hours.    Patient was deemed stable for discharge home and patient was afebrile.    DISCHARGE CONDITION:  Stable.    DISPOSITION:  Home.    ACTIVITY:  As tolerated.    DIET:  Regular diet.    DISCHARGE MEDICATIONS:  Please note that I have discussed possibility of VTE   prophylaxis with pharmacological therapy in regard to his superficial venous   thrombus and patient states that he would rather not take any oral   anticoagulants, so patient was just recommended to take aspirin in full dose   325 mg p.o. daily for 30 days.  In regard to patient's antibiotic treatment   per Dr. Sousa's recommendations, patient is to continue on Augmentin 875/125   mg 1 tablet by mouth twice daily for 10 days and patient also prescribed   Bactrim DS 1 tablet by mouth twice daily.  Please note that this   medication has been prescribed for 14 days as patient stated that he was   previously started on doxycycline by urologist for presumed treatment of   prostatitis, but unremarkable, but given that there was presumed   diagnosis of prostatitis, patient was recommended to have extended therapy of   treatments.    Patient is also on Flomax home medication 0.4 mg by mouth twice daily and   patient's home  medication of doxycycline was discontinued.  Patient was to   continue on loratadine 10 mg 1 tablet by mouth as needed for allergies.    Total discharge time was 32 minutes.    FOLLOWUP INSTRUCTIONS:  Patient may follow up with primary care provider in 1   week and patient may follow up with infectious disease, Dr. Anny Sousa in   2 weeks.       ____________________________________     DO ИРИНА Marcum / JAMES    DD:  03/20/2017 06:29:00  DT:  03/20/2017 08:40:44    D#:  [id number]  Job#:  [id number]

## 2017-03-20 NOTE — DISCHARGE INSTRUCTIONS
Discharge Instructions    Discharged to home by car with friend. Discharged via wheelchair, hospital escort: Yes.  Special equipment needed: Not Applicable    Be sure to schedule a follow-up appointment with your primary care doctor or any specialists as instructed.     Discharge Plan:   Diet Plan: Discussed  Activity Level: Discussed  Confirmed Follow up Appointment: Patient to Call and Schedule Appointment  Confirmed Symptoms Management: Discussed  Medication Reconciliation Updated: Yes  Influenza Vaccine Indication: Patient Refuses    I understand that a diet low in cholesterol, fat, and sodium is recommended for good health. Unless I have been given specific instructions below for another diet, I accept this instruction as my diet prescription.   Other diet: regular    Special Instructions: None    · Is patient discharged on Warfarin / Coumadin?   No     · Is patient Post Blood Transfusion?  No    Depression / Suicide Risk    As you are discharged from this [facility] facility, it is important to learn how to keep safe from harming yourself.    Recognize the warning signs:  · Abrupt changes in personality, positive or negative- including increase in energy   · Giving away possessions  · Change in eating patterns- significant weight changes-  positive or negative  · Change in sleeping patterns- unable to sleep or sleeping all the time   · Unwillingness or inability to communicate  · Depression  · Unusual sadness, discouragement and loneliness  · Talk of wanting to die  · Neglect of personal appearance   · Rebelliousness- reckless behavior  · Withdrawal from people/activities they love  · Confusion- inability to concentrate     If you or a loved one observes any of these behaviors or has concerns about self-harm, here's what you can do:  · Talk about it- your feelings and reasons for harming yourself  · Remove any means that you might use to hurt yourself (examples: pills, rope, extension cords, firearm)  · Get  professional help from the community (Mental Health, Substance Abuse, psychological counseling)  · Do not be alone:Call your Safe Contact- someone whom you trust who will be there for you.  · Call your local CRISIS HOTLINE 026-9203 or 730-659-8597  · Call your local Children's Mobile Crisis Response Team [place] (590) 650-8058 or www.[employer]  · Call the toll free National Suicide Prevention Hotlines   · National Suicide Prevention Lifeline 423-859-FBKL (4180)  · National Hope Line Network 800-SUICIDE (880-9114)

## 2017-03-22 LAB
BACTERIA BLD CULT: NORMAL
SIGNIFICANT IND 70042: NORMAL
SITE SITE: NORMAL
SOURCE SOURCE: NORMAL

## 2021-01-15 DIAGNOSIS — Z23 NEED FOR VACCINATION: ICD-10-CM

## 2022-02-28 ENCOUNTER — APPOINTMENT (RX ONLY)
Dept: URBAN - METROPOLITAN AREA CLINIC 6 | Facility: CLINIC | Age: 77
Setting detail: DERMATOLOGY
End: 2022-02-28

## 2022-02-28 DIAGNOSIS — L57.0 ACTINIC KERATOSIS: ICD-10-CM

## 2022-02-28 PROCEDURE — ? DIAGNOSIS COMMENT

## 2022-02-28 PROCEDURE — ? COUNSELING

## 2022-02-28 PROCEDURE — ? DEFER

## 2022-02-28 PROCEDURE — 99203 OFFICE O/P NEW LOW 30 MIN: CPT

## 2022-02-28 ASSESSMENT — LOCATION DETAILED DESCRIPTION DERM
LOCATION DETAILED: LEFT MEDIAL ZYGOMA
LOCATION DETAILED: RIGHT SUPERIOR FOREHEAD
LOCATION DETAILED: LEFT CRUS OF HELIX

## 2022-02-28 ASSESSMENT — LOCATION SIMPLE DESCRIPTION DERM
LOCATION SIMPLE: LEFT ZYGOMA
LOCATION SIMPLE: LEFT EAR
LOCATION SIMPLE: RIGHT FOREHEAD

## 2022-02-28 ASSESSMENT — LOCATION ZONE DERM
LOCATION ZONE: FACE
LOCATION ZONE: EAR

## 2022-02-28 NOTE — PROCEDURE: DEFER
Detail Level: Detailed
Introduction Text (Please End With A Colon): would like to hold off on any additional treatment until next visit
Instructions (Optional): Pt going out of town, will treat after March 22nd

## 2022-03-23 ENCOUNTER — APPOINTMENT (RX ONLY)
Dept: URBAN - METROPOLITAN AREA CLINIC 6 | Facility: CLINIC | Age: 77
Setting detail: DERMATOLOGY
End: 2022-03-23

## 2022-03-23 DIAGNOSIS — L57.0 ACTINIC KERATOSIS: ICD-10-CM

## 2022-03-23 PROCEDURE — ? LIQUID NITROGEN

## 2022-03-23 PROCEDURE — 17000 DESTRUCT PREMALG LESION: CPT

## 2022-03-23 PROCEDURE — ? COUNSELING

## 2022-03-23 PROCEDURE — 17003 DESTRUCT PREMALG LES 2-14: CPT

## 2022-03-23 ASSESSMENT — LOCATION ZONE DERM
LOCATION ZONE: EAR
LOCATION ZONE: FACE

## 2022-03-23 ASSESSMENT — LOCATION DETAILED DESCRIPTION DERM
LOCATION DETAILED: LEFT MEDIAL ZYGOMA
LOCATION DETAILED: RIGHT SUPERIOR FOREHEAD
LOCATION DETAILED: LEFT FOREHEAD
LOCATION DETAILED: LEFT CENTRAL MALAR CHEEK
LOCATION DETAILED: LEFT CRUS OF HELIX

## 2022-03-23 ASSESSMENT — LOCATION SIMPLE DESCRIPTION DERM
LOCATION SIMPLE: RIGHT FOREHEAD
LOCATION SIMPLE: LEFT ZYGOMA
LOCATION SIMPLE: LEFT FOREHEAD
LOCATION SIMPLE: LEFT CHEEK
LOCATION SIMPLE: LEFT EAR

## 2022-03-23 NOTE — PROCEDURE: LIQUID NITROGEN
Number Of Freeze-Thaw Cycles: 2 freeze-thaw cycles
Show Aperture Variable?: Yes
Render Post-Care Instructions In Note?: no
Detail Level: Detailed
Consent: The patient's consent was obtained including but not limited to risks of crusting, scabbing, blistering, scarring, darker or lighter pigmentary change, recurrence, incomplete removal and infection.
Duration Of Freeze Thaw-Cycle (Seconds): 10
Post-Care Instructions: I reviewed with the patient in detail post-care instructions. Patient is to wear sunprotection, and avoid picking at any of the treated lesions. Pt may apply Vaseline to crusted or scabbing areas.

## 2022-09-23 ENCOUNTER — APPOINTMENT (RX ONLY)
Dept: URBAN - METROPOLITAN AREA CLINIC 6 | Facility: CLINIC | Age: 77
Setting detail: DERMATOLOGY
End: 2022-09-23

## 2022-09-23 DIAGNOSIS — L57.0 ACTINIC KERATOSIS: ICD-10-CM

## 2022-09-23 PROCEDURE — ? LIQUID NITROGEN

## 2022-09-23 PROCEDURE — ? COUNSELING

## 2022-09-23 PROCEDURE — 17003 DESTRUCT PREMALG LES 2-14: CPT

## 2022-09-23 PROCEDURE — 17000 DESTRUCT PREMALG LESION: CPT

## 2022-09-23 ASSESSMENT — LOCATION DETAILED DESCRIPTION DERM
LOCATION DETAILED: LEFT INFERIOR FOREHEAD
LOCATION DETAILED: RIGHT MEDIAL FOREHEAD
LOCATION DETAILED: RIGHT CENTRAL ZYGOMA
LOCATION DETAILED: RIGHT FOREHEAD
LOCATION DETAILED: LEFT SUPERIOR MEDIAL FOREHEAD

## 2022-09-23 ASSESSMENT — LOCATION SIMPLE DESCRIPTION DERM
LOCATION SIMPLE: RIGHT ZYGOMA
LOCATION SIMPLE: RIGHT FOREHEAD
LOCATION SIMPLE: LEFT FOREHEAD

## 2022-09-23 ASSESSMENT — LOCATION ZONE DERM: LOCATION ZONE: FACE

## 2022-09-23 NOTE — HPI: SKIN LESION (ACTINIC KERATOSES)
How Severe Are They?: mild
Is This A New Presentation, Or A Follow-Up?: Follow Up Actinic Keratoses
Is This Lesion Biopsy Proven?: No

## 2022-09-23 NOTE — PROCEDURE: LIQUID NITROGEN
Show Applicator Variable?: Yes
Render Note In Bullet Format When Appropriate: No
Post-Care Instructions: I reviewed with the patient in detail post-care instructions. Patient is to wear sunprotection, and avoid picking at any of the treated lesions. Pt may apply Vaseline to crusted or scabbing areas.
Duration Of Freeze Thaw-Cycle (Seconds): 10
Detail Level: Detailed
Number Of Freeze-Thaw Cycles: 2 freeze-thaw cycles
Consent: The patient's consent was obtained including but not limited to risks of crusting, scabbing, blistering, scarring, darker or lighter pigmentary change, recurrence, incomplete removal and infection.

## 2023-06-06 ENCOUNTER — TELEPHONE (OUTPATIENT)
Dept: CARDIOLOGY | Facility: MEDICAL CENTER | Age: 78
End: 2023-06-06
Payer: COMMERCIAL

## 2023-06-06 ENCOUNTER — HOSPITAL ENCOUNTER (EMERGENCY)
Facility: MEDICAL CENTER | Age: 78
End: 2023-06-06
Attending: EMERGENCY MEDICINE
Payer: COMMERCIAL

## 2023-06-06 ENCOUNTER — APPOINTMENT (OUTPATIENT)
Dept: RADIOLOGY | Facility: MEDICAL CENTER | Age: 78
End: 2023-06-06
Attending: EMERGENCY MEDICINE
Payer: COMMERCIAL

## 2023-06-06 VITALS
BODY MASS INDEX: 24.37 KG/M2 | HEIGHT: 72 IN | DIASTOLIC BLOOD PRESSURE: 80 MMHG | HEART RATE: 75 BPM | WEIGHT: 179.9 LBS | SYSTOLIC BLOOD PRESSURE: 136 MMHG | RESPIRATION RATE: 16 BRPM | TEMPERATURE: 97.4 F | OXYGEN SATURATION: 98 %

## 2023-06-06 DIAGNOSIS — I48.92 ATRIAL FLUTTER, UNSPECIFIED TYPE (HCC): ICD-10-CM

## 2023-06-06 DIAGNOSIS — R00.2 PALPITATIONS: ICD-10-CM

## 2023-06-06 LAB
ALBUMIN SERPL BCP-MCNC: 4.1 G/DL (ref 3.2–4.9)
ALBUMIN/GLOB SERPL: 1.5 G/DL
ALP SERPL-CCNC: 89 U/L (ref 30–99)
ALT SERPL-CCNC: 19 U/L (ref 2–50)
ANION GAP SERPL CALC-SCNC: 9 MMOL/L (ref 7–16)
AST SERPL-CCNC: 25 U/L (ref 12–45)
BASOPHILS # BLD AUTO: 0.7 % (ref 0–1.8)
BASOPHILS # BLD: 0.06 K/UL (ref 0–0.12)
BILIRUB SERPL-MCNC: 0.7 MG/DL (ref 0.1–1.5)
BUN SERPL-MCNC: 16 MG/DL (ref 8–22)
CALCIUM ALBUM COR SERPL-MCNC: 9.4 MG/DL (ref 8.5–10.5)
CALCIUM SERPL-MCNC: 9.5 MG/DL (ref 8.4–10.2)
CHLORIDE SERPL-SCNC: 107 MMOL/L (ref 96–112)
CO2 SERPL-SCNC: 24 MMOL/L (ref 20–33)
CREAT SERPL-MCNC: 0.86 MG/DL (ref 0.5–1.4)
EKG IMPRESSION: NORMAL
EOSINOPHIL # BLD AUTO: 0.22 K/UL (ref 0–0.51)
EOSINOPHIL NFR BLD: 2.7 % (ref 0–6.9)
ERYTHROCYTE [DISTWIDTH] IN BLOOD BY AUTOMATED COUNT: 45.1 FL (ref 35.9–50)
GFR SERPLBLD CREATININE-BSD FMLA CKD-EPI: 89 ML/MIN/1.73 M 2
GLOBULIN SER CALC-MCNC: 2.7 G/DL (ref 1.9–3.5)
GLUCOSE SERPL-MCNC: 89 MG/DL (ref 65–99)
HCT VFR BLD AUTO: 51.3 % (ref 42–52)
HGB BLD-MCNC: 17.7 G/DL (ref 14–18)
IMM GRANULOCYTES # BLD AUTO: 0.03 K/UL (ref 0–0.11)
IMM GRANULOCYTES NFR BLD AUTO: 0.4 % (ref 0–0.9)
LYMPHOCYTES # BLD AUTO: 1.94 K/UL (ref 1–4.8)
LYMPHOCYTES NFR BLD: 23.7 % (ref 22–41)
MCH RBC QN AUTO: 32.4 PG (ref 27–33)
MCHC RBC AUTO-ENTMCNC: 34.5 G/DL (ref 32.3–36.5)
MCV RBC AUTO: 94 FL (ref 81.4–97.8)
MONOCYTES # BLD AUTO: 0.64 K/UL (ref 0–0.85)
MONOCYTES NFR BLD AUTO: 7.8 % (ref 0–13.4)
NEUTROPHILS # BLD AUTO: 5.29 K/UL (ref 1.82–7.42)
NEUTROPHILS NFR BLD: 64.7 % (ref 44–72)
NRBC # BLD AUTO: 0 K/UL
NRBC BLD-RTO: 0 /100 WBC (ref 0–0.2)
PLATELET # BLD AUTO: 121 K/UL (ref 164–446)
PMV BLD AUTO: 11.7 FL (ref 9–12.9)
POTASSIUM SERPL-SCNC: 4.1 MMOL/L (ref 3.6–5.5)
PROT SERPL-MCNC: 6.8 G/DL (ref 6–8.2)
RBC # BLD AUTO: 5.46 M/UL (ref 4.7–6.1)
SODIUM SERPL-SCNC: 140 MMOL/L (ref 135–145)
TROPONIN T SERPL-MCNC: 14 NG/L (ref 6–19)
TSH SERPL DL<=0.005 MIU/L-ACNC: 2.07 UIU/ML (ref 0.38–5.33)
WBC # BLD AUTO: 8.2 K/UL (ref 4.8–10.8)

## 2023-06-06 PROCEDURE — 93005 ELECTROCARDIOGRAM TRACING: CPT | Performed by: EMERGENCY MEDICINE

## 2023-06-06 PROCEDURE — 99284 EMERGENCY DEPT VISIT MOD MDM: CPT

## 2023-06-06 PROCEDURE — 93005 ELECTROCARDIOGRAM TRACING: CPT

## 2023-06-06 PROCEDURE — 85025 COMPLETE CBC W/AUTO DIFF WBC: CPT

## 2023-06-06 PROCEDURE — 71045 X-RAY EXAM CHEST 1 VIEW: CPT

## 2023-06-06 PROCEDURE — 80053 COMPREHEN METABOLIC PANEL: CPT

## 2023-06-06 PROCEDURE — 84443 ASSAY THYROID STIM HORMONE: CPT

## 2023-06-06 PROCEDURE — 36415 COLL VENOUS BLD VENIPUNCTURE: CPT

## 2023-06-06 PROCEDURE — 84484 ASSAY OF TROPONIN QUANT: CPT

## 2023-06-06 RX ORDER — ALBUTEROL SULFATE 90 UG/1
1 AEROSOL, METERED RESPIRATORY (INHALATION) EVERY 4 HOURS PRN
COMMUNITY
Start: 2023-05-17

## 2023-06-06 RX ORDER — FLUTICASONE PROPIONATE AND SALMETEROL 113; 14 UG/1; UG/1
1 POWDER, METERED RESPIRATORY (INHALATION) 2 TIMES DAILY
COMMUNITY
Start: 2023-03-17

## 2023-06-06 RX ORDER — OMEPRAZOLE 40 MG/1
CAPSULE, DELAYED RELEASE ORAL
Status: SHIPPED | COMMUNITY
Start: 2023-03-13 | End: 2023-06-06

## 2023-06-06 ASSESSMENT — HEART SCORE
TROPONIN: LESS THAN OR EQUAL TO NORMAL LIMIT
ECG: NORMAL
HEART SCORE: 2
RISK FACTORS: NO KNOWN RISK FACTORS
AGE: 65+
HISTORY: SLIGHTLY SUSPICIOUS

## 2023-06-06 NOTE — ED PROVIDER NOTES
"ED Provider Note    CHIEF COMPLAINT  Chief Complaint   Patient presents with    Irregular Heart Beat     Pt c/o \"tachy arrhythmia\" started last night  Reports Hx of Flutter requiring cardioversion x 2       EXTERNAL RECORDS REVIEWED  Outpatient Notes was seen 5/6/2023 at [facility] for a COVID  exposure and his COVID test came back positive    HPI/ROS  LIMITATION TO HISTORY   Select: : None  OUTSIDE HISTORIAN(S):  none    Andry Calles is a 78 y.o. male who presents complaining of fluttering in his chest that started last night and he also woke up with it this morning.  States that his pulse felt very fast but he cannot tell exactly how fast.  The patient recently had COVID a few weeks ago and has been using his albuterol inhaler up to 4 times a day ever since he was recovering from COVID.  He denies any fevers or chills or productive cough.  He denies any chest pains or shortness of breath.  He does have a history of atrial flutter with his heart rate in the 300s that required cardioversion 2 times in his life.  He is not on any anticoagulant medications.  He is a non-smoker.  He denies any swelling in his legs.    PAST MEDICAL HISTORY   has a past medical history of Atrial flutter (HCC) and COVID.    SURGICAL HISTORY   has a past surgical history that includes other orthopedic surgery and other abdominal surgery.    FAMILY HISTORY  History reviewed. No pertinent family history.    SOCIAL HISTORY  Social History     Tobacco Use    Smoking status: Never    Smokeless tobacco: Never   Vaping Use    Vaping Use: Never used   Substance and Sexual Activity    Alcohol use: No    Drug use: No    Sexual activity: Not on file       CURRENT MEDICATIONS  Home Medications    **Home medications have not yet been reviewed for this encounter**         ALLERGIES  Allergies   Allergen Reactions    Other Environmental        PHYSICAL EXAM  VITAL SIGNS: BP (!) 148/89   Pulse 73   Temp 36.3 °C (97.4 °F) (Temporal)   Resp 16 "   Ht 1.829 m (6')   Wt 81.6 kg (179 lb 14.3 oz)   SpO2 97%   BMI 24.40 kg/m²      Constitutional: Well developed, Well nourished, No acute distress, Non-toxic appearance.   HEENT: Normocephalic, Atraumatic,  external ears normal, pharynx pink,  Mucous  Membranes moist, No rhinorrhea or mucosal edema  Eyes: PERRL, EOMI, Conjunctiva normal, No discharge.   Neck: Normal range of motion, No tenderness, Supple, No stridor.   Lymphatic: No lymphadenopathy    Cardiovascular: Regular Rate and Rhythm, No murmurs,  rubs, or gallops.   Thorax & Lungs: Lungs clear to auscultation bilaterally, No respiratory distress, No wheezes, rhales or rhonchi, No chest wall tenderness.   Abdomen: Bowel sounds normal, Soft, non tender, non distended,  No pulsatile masses., no rebound guarding or peritoneal signs.   Skin: Warm, Dry, No erythema, No rash,   Extremities: Equal, intact distal pulses, No cyanosis, clubbing or edema,  No tenderness.  No venous cording or Homans' sign  Musculoskeletal: Good range of motion in all major joints. No tenderness to palpation or major deformities noted.   Neurologic: Alert & oriented,  No focal deficits noted.   Psychiatric: Affect normal, Judgment normal, Mood normal.     DIAGNOSTIC STUDIES / PROCEDURES  EKG  I have independently interpreted this EKG  See below    LABS  Results for orders placed or performed during the hospital encounter of 06/06/23   CBC WITH DIFFERENTIAL   Result Value Ref Range    WBC 8.2 4.8 - 10.8 K/uL    RBC 5.46 4.70 - 6.10 M/uL    Hemoglobin 17.7 14.0 - 18.0 g/dL    Hematocrit 51.3 42.0 - 52.0 %    MCV 94.0 81.4 - 97.8 fL    MCH 32.4 27.0 - 33.0 pg    MCHC 34.5 32.3 - 36.5 g/dL    RDW 45.1 35.9 - 50.0 fL    Platelet Count 121 (L) 164 - 446 K/uL    MPV 11.7 9.0 - 12.9 fL    Neutrophils-Polys 64.70 44.00 - 72.00 %    Lymphocytes 23.70 22.00 - 41.00 %    Monocytes 7.80 0.00 - 13.40 %    Eosinophils 2.70 0.00 - 6.90 %    Basophils 0.70 0.00 - 1.80 %    Immature Granulocytes 0.40  0.00 - 0.90 %    Nucleated RBC 0.00 0.00 - 0.20 /100 WBC    Neutrophils (Absolute) 5.29 1.82 - 7.42 K/uL    Lymphs (Absolute) 1.94 1.00 - 4.80 K/uL    Monos (Absolute) 0.64 0.00 - 0.85 K/uL    Eos (Absolute) 0.22 0.00 - 0.51 K/uL    Baso (Absolute) 0.06 0.00 - 0.12 K/uL    Immature Granulocytes (abs) 0.03 0.00 - 0.11 K/uL    NRBC (Absolute) 0.00 K/uL   Comp Metabolic Panel   Result Value Ref Range    Sodium 140 135 - 145 mmol/L    Potassium 4.1 3.6 - 5.5 mmol/L    Chloride 107 96 - 112 mmol/L    Co2 24 20 - 33 mmol/L    Anion Gap 9.0 7.0 - 16.0    Glucose 89 65 - 99 mg/dL    Bun 16 8 - 22 mg/dL    Creatinine 0.86 0.50 - 1.40 mg/dL    Calcium 9.5 8.4 - 10.2 mg/dL    AST(SGOT) 25 12 - 45 U/L    ALT(SGPT) 19 2 - 50 U/L    Alkaline Phosphatase 89 30 - 99 U/L    Total Bilirubin 0.7 0.1 - 1.5 mg/dL    Albumin 4.1 3.2 - 4.9 g/dL    Total Protein 6.8 6.0 - 8.2 g/dL    Globulin 2.7 1.9 - 3.5 g/dL    A-G Ratio 1.5 g/dL   TROPONIN   Result Value Ref Range    Troponin T 14 6 - 19 ng/L   TSH WITH REFLEX TO FT4   Result Value Ref Range    TSH 2.070 0.380 - 5.330 uIU/mL   CORRECTED CALCIUM   Result Value Ref Range    Correct Calcium 9.4 8.5 - 10.5 mg/dL   ESTIMATED GFR   Result Value Ref Range    GFR (CKD-EPI) 89 >60 mL/min/1.73 m 2   EKG   Result Value Ref Range    Report       [facility] Emergency Dept.    Test Date:  2023  Pt Name:    TONIO BALDWIN             Department: [facility]  MRN:        [id number]                      Room:  Gender:     Male                         Technician: [id number]  :        1945                   Requested By:ER TRIAGE PROTOCOL  Order #:    [id number]                    Reading MD: VALERIO MCKENZIE MD    Measurements  Intervals                                Axis  Rate:       73                           P:          65  MD:         203                          QRS:        75  QRSD:       112                          T:          61  QT:         378  QTc:         417    Interpretive Statements  Sinus rhythm  Incomplete right bundle branch block  No previous ECG available for comparison  Electronically Signed On 6-6-2023 15:17:19 PDT by VALERIO MCKENZIE MD           RADIOLOGY  I have independently interpreted the diagnostic imaging associated with this visit and am waiting the final reading from the radiologist.   My preliminary interpretation is as follows: cxr no infiltrates or pleural effusions  Radiologist interpretation:   DX-CHEST-PORTABLE (1 VIEW)   Final Result      Moderate cardiac silhouette enlargement without edema identified           COURSE & MEDICAL DECISION MAKING    ED Observation Status? Yes; I am placing the patient in to an observation status due to a diagnostic uncertainty as well as therapeutic intensity. Patient placed in observation status at 3:20 PM, 6/6/2023.     Observation plan is as follows: labs cxr and ekg    Upon Reevaluation, the patient's condition has: Improved; and will be discharged.    Patient discharged from ED Observation status at 4:00 PM  (Time) 6/6/23 (Date).     INITIAL ASSESSMENT, COURSE AND PLAN  Care Narrative: This is a 78-year-old male with a history of atrial flutter in the past that required cardioversion who presents today with some fluttering in his chest since last night and tachycardia.  He has been using albuterol more since he had COVID at the beginning of last month.  He denies any leg swelling.  Currently his heart feels back to normal.        ADDITIONAL PROBLEM LIST  History of atrial flutter  DISPOSITION AND DISCUSSIONS    The patient's comprehensive metabolic panel has normal electrolytes normal kidney function and normal liver function test.  CBC shows white count at 8.2 platelet count slightly low at 121 hemoglobin 17.7 and a normal differential.  Troponin is 14.  His EKG shows an incomplete right bundle branch block which he states is chronic gives him a heart score of 2.  His chest x-ray does not show pneumonia or  pleural effusions.  His thyroid function is normal. At this time the patient is reassured that he does not have atrial flutter.  I will set him up for an outpatient cardiology evaluation and Holter monitor.  I advised him to stop using the albuterol inhaler and follow-up with cardiology as an outpatient.  If he does get any chest pain fluttering in the chest or worsening symptoms he should return to the emergency department for further evaluation.  I have discussed management of the patient with the following physicians and GHISLAINE's:  none    Discussion of management with other [facility] or appropriate source(s): None     Escalation of care considered, and ultimately not performed:acute inpatient care management, however at this time, the patient is most appropriate for outpatient management    Barriers to care at this time, including but not limited to: Patient does not have established PCP.     Decision tools and prescription drugs considered including, but not limited to: HEART Score 2 .  The patient will return for new or worsening symptoms and is stable at the time of discharge.    The patient is referred to a primary physician for blood pressure management, diabetic screening, and for all other preventative health concerns.      DISPOSITION:  Patient will be discharged home in stable condition.    FOLLOW UP:  Donny Hernandez M.D.  [address]  [address]  [facility] [id number] [phone]    Call in 1 day  to establish care, for recheck      OUTPATIENT MEDICATIONS:  New Prescriptions    No medications on file       FINAL DIAGNOSIS  1. Palpitations           Electronically signed by: Effie Negrete M.D., 6/6/2023 3:17 PM

## 2023-06-06 NOTE — TELEPHONE ENCOUNTER
ADD          Caller: Dr. KVNG Eller    Topic/issue: This doctor's office was calling about A-flutter for this patient and he was asking for a call back about this matter with the patient ASAP    Callback Number: see below      Thank you    -Bakari

## 2023-06-06 NOTE — ED NOTES
Med rec updated and complete, per pt   Allergies reviewed, per pt  Interviewed pt with brother in law at bedside with permission from pt.  Pt reports that he has not taken his OMEPRAZOLE 40MG, LORATADINE 10MG, or TAMSULOSIN 0.4MG in the last 30 days or longer.

## 2023-06-06 NOTE — ED NOTES
Pt given d/c paperwork and f/u instruction; pt verbalized understanding all information given. Pt ambulated out of the ER w/o difficulty w/ friend

## 2023-06-06 NOTE — TELEPHONE ENCOUNTER
Dr. Eller had called his brother-in-law Dr. Calles developed atrial flutter     Given known onset he would benefit from cardioversion in the emergency room start anticoagulation and see our EP service I put in referral for EP service    It is my pleasure to participate in the care of Mr. Calles.  Please do not hesitate to contact me with questions or concerns.    Donny Hernandez MD PhD [facility]  Cardiologist [facility]    Please note that this dictation was created using voice recognition software. There may be errors I did not discover before finalizing the note.     6/6/2023  1:48 PM

## 2023-06-06 NOTE — ED TRIAGE NOTES
"Chief Complaint   Patient presents with    Irregular Heart Beat     Pt c/o \"tachy arrhythmia\" started last night  Reports Hx of Flutter requiring cardioversion x 2     BP (!) 154/111   Pulse 74   Temp 36.3 °C (97.4 °F) (Temporal)   Resp 18   Ht 1.829 m (6')   Wt 81.6 kg (179 lb 14.3 oz)   SpO2 95%   BMI 24.40 kg/m²     Pt ambulated to ED by self for c/o irregular HR last night and today.  Pt currently denies c/o pain.  Pt states has been using Albuterol daily s/p Covid about 1.5 months ago.    "

## 2023-06-15 ENCOUNTER — TELEPHONE (OUTPATIENT)
Dept: CARDIOLOGY | Facility: MEDICAL CENTER | Age: 78
End: 2023-06-15
Payer: COMMERCIAL

## 2023-06-15 NOTE — TELEPHONE ENCOUNTER
CHART PREP  [facility]    Spoke with patient in regards to NP appointment with Dr. Ornelas. Patient has been seen by a prior cardiologist at [facility] with Dr. Garth Li. Records requested: last OV note from Dr. Li, cardiac testing/imaging/procedures. Will scan records into patient's chart once received.    [facility]  Phone: [phone]

## 2023-07-11 NOTE — ED NOTES
C/o pain, redness and swelling to left wrist and hand x 2 days   Health Maintenance Due   Topic Date Due   • DTaP/Tdap/Td Vaccine (1 - Tdap) Never done   • Shingles Vaccine (1 of 2) Never done   • Pneumococcal Vaccine 65+ (1 - PCV) Never done   • Traditional Medicare- Medicare Wellness Visit  Never done   • COVID-19 Vaccine (5 - Pfizer series) 01/12/2023       Patient is due for topics as listed above but is not proceeding with Immunization(s) COVID-19, Dtap/Tdap/Td, Pneumococcal and Shingles and MWV (Medicare Wellness Visit) at this time. Patient education given.

## 2023-09-21 ENCOUNTER — APPOINTMENT (RX ONLY)
Dept: URBAN - METROPOLITAN AREA CLINIC 6 | Facility: CLINIC | Age: 78
Setting detail: DERMATOLOGY
End: 2023-09-21

## 2023-09-21 DIAGNOSIS — L81.4 OTHER MELANIN HYPERPIGMENTATION: ICD-10-CM

## 2023-09-21 DIAGNOSIS — L73.9 FOLLICULAR DISORDER, UNSPECIFIED: ICD-10-CM

## 2023-09-21 DIAGNOSIS — L82.1 OTHER SEBORRHEIC KERATOSIS: ICD-10-CM

## 2023-09-21 DIAGNOSIS — L663 OTHER SPECIFIED DISEASES OF HAIR AND HAIR FOLLICLES: ICD-10-CM

## 2023-09-21 DIAGNOSIS — L738 OTHER SPECIFIED DISEASES OF HAIR AND HAIR FOLLICLES: ICD-10-CM

## 2023-09-21 DIAGNOSIS — Z87.2 PERSONAL HISTORY OF DISEASES OF THE SKIN AND SUBCUTANEOUS TISSUE: ICD-10-CM

## 2023-09-21 PROBLEM — L30.9 DERMATITIS, UNSPECIFIED: Status: ACTIVE | Noted: 2023-09-21

## 2023-09-21 PROCEDURE — ? ADDITIONAL NOTES

## 2023-09-21 PROCEDURE — ? INTRALESIONAL KENALOG

## 2023-09-21 PROCEDURE — 99212 OFFICE O/P EST SF 10 MIN: CPT | Mod: 25

## 2023-09-21 PROCEDURE — 11900 INJECT SKIN LESIONS </W 7: CPT

## 2023-09-21 PROCEDURE — ? COUNSELING

## 2023-09-21 ASSESSMENT — LOCATION DETAILED DESCRIPTION DERM
LOCATION DETAILED: RIGHT SUPERIOR CENTRAL MALAR CHEEK
LOCATION DETAILED: RIGHT INFERIOR CENTRAL MALAR CHEEK
LOCATION DETAILED: LEFT LATERAL MALAR CHEEK
LOCATION DETAILED: RIGHT INFERIOR LATERAL NECK

## 2023-09-21 ASSESSMENT — LOCATION SIMPLE DESCRIPTION DERM
LOCATION SIMPLE: LEFT CHEEK
LOCATION SIMPLE: RIGHT CHEEK
LOCATION SIMPLE: RIGHT ANTERIOR NECK

## 2023-09-21 ASSESSMENT — LOCATION ZONE DERM
LOCATION ZONE: NECK
LOCATION ZONE: FACE

## 2023-09-21 NOTE — PROCEDURE: INTRALESIONAL KENALOG
Detail Level: Detailed
How Many Mls Were Removed From The 40 Mg/Ml (10ml) Vial When Preparing The Injectable Solution?: 0
Validate Note Data When Using Inventory: Yes
Bill For Wasted Drug (Kenalog)?: no
Expiration Date For Kenalog (Optional): AUG 2024
Total Volume (Ccs): 0.2
Kenalog Preparation: Kenalog
Consent: The risks of atrophy were reviewed with the patient.
Concentration Of Kenalog Solution Injected (Mg/Ml): 10.0
Administered By (Optional): YULISSA
Lot # For Kenalog (Optional): [id number]
Medical Necessity Clause: This procedure was medically necessary because the lesions that were treated were:
Ndc# For Kenalog Only: 6847-9287-29
Kenalog Type Of Vial: Multiple Dose
Information: Selecting Yes will display possible errors in your note based on the variables you have selected. This validation is only offered as a suggestion for you. PLEASE NOTE THAT THE VALIDATION TEXT WILL BE REMOVED WHEN YOU FINALIZE YOUR NOTE. IF YOU WANT TO FAX A PRELIMINARY NOTE YOU WILL NEED TO TOGGLE THIS TO 'NO' IF YOU DO NOT WANT IT IN YOUR FAXED NOTE.

## 2023-09-21 NOTE — PROCEDURE: ADDITIONAL NOTES
Render Risk Assessment In Note?: no
Additional Notes: instructed to RTC if no improvement in 2-3 weeks
Detail Level: Simple

## 2024-03-21 ENCOUNTER — APPOINTMENT (RX ONLY)
Dept: URBAN - METROPOLITAN AREA CLINIC 6 | Facility: CLINIC | Age: 79
Setting detail: DERMATOLOGY
End: 2024-03-21

## 2024-03-21 DIAGNOSIS — L57.0 ACTINIC KERATOSIS: ICD-10-CM

## 2024-03-21 DIAGNOSIS — L64.8 OTHER ANDROGENIC ALOPECIA: ICD-10-CM

## 2024-03-21 PROBLEM — L65.9 NONSCARRING HAIR LOSS, UNSPECIFIED: Status: ACTIVE | Noted: 2024-03-21

## 2024-03-21 PROCEDURE — 99212 OFFICE O/P EST SF 10 MIN: CPT | Mod: 25

## 2024-03-21 PROCEDURE — ? PRESCRIPTION MEDICATION MANAGEMENT

## 2024-03-21 PROCEDURE — ? LIQUID NITROGEN

## 2024-03-21 PROCEDURE — 17003 DESTRUCT PREMALG LES 2-14: CPT

## 2024-03-21 PROCEDURE — 17000 DESTRUCT PREMALG LESION: CPT

## 2024-03-21 PROCEDURE — ? COUNSELING

## 2024-03-21 ASSESSMENT — LOCATION ZONE DERM
LOCATION ZONE: FACE
LOCATION ZONE: SCALP

## 2024-03-21 ASSESSMENT — LOCATION DETAILED DESCRIPTION DERM
LOCATION DETAILED: RIGHT SUPERIOR FRONTAL SCALP
LOCATION DETAILED: LEFT SUPERIOR FOREHEAD
LOCATION DETAILED: MID-FRONTAL SCALP
LOCATION DETAILED: LEFT MEDIAL ZYGOMA
LOCATION DETAILED: RIGHT FOREHEAD
LOCATION DETAILED: LEFT CENTRAL MALAR CHEEK

## 2024-03-21 ASSESSMENT — LOCATION SIMPLE DESCRIPTION DERM
LOCATION SIMPLE: LEFT FOREHEAD
LOCATION SIMPLE: LEFT CHEEK
LOCATION SIMPLE: ANTERIOR SCALP
LOCATION SIMPLE: RIGHT FOREHEAD
LOCATION SIMPLE: LEFT ZYGOMA
LOCATION SIMPLE: SCALP

## 2024-03-21 NOTE — PROCEDURE: LIQUID NITROGEN
Render Note In Bullet Format When Appropriate: No
Duration Of Freeze Thaw-Cycle (Seconds): 10
Consent: The patient's consent was obtained including but not limited to risks of crusting, scabbing, blistering, scarring, darker or lighter pigmentary change, recurrence, incomplete removal and infection.
Number Of Freeze-Thaw Cycles: 2 freeze-thaw cycles
Detail Level: Detailed
Show Applicator Variable?: Yes
Post-Care Instructions: I reviewed with the patient in detail post-care instructions. Patient is to wear sunprotection, and avoid picking at any of the treated lesions. Pt may apply Vaseline to crusted or scabbing areas.

## 2024-03-21 NOTE — PROCEDURE: PRESCRIPTION MEDICATION MANAGEMENT
Plan: Discussed r/b finasteride and oral minoxidil.\\nRecommended nutrafol, saw palmetto, and OTC rogaine.
Detail Level: Zone
Render In Strict Bullet Format?: No

## 2024-04-01 ENCOUNTER — HOSPITAL ENCOUNTER (EMERGENCY)
Facility: MEDICAL CENTER | Age: 79
End: 2024-04-01
Attending: EMERGENCY MEDICINE
Payer: MEDICARE

## 2024-04-01 VITALS
OXYGEN SATURATION: 96 % | WEIGHT: 182.98 LBS | HEART RATE: 92 BPM | RESPIRATION RATE: 16 BRPM | DIASTOLIC BLOOD PRESSURE: 72 MMHG | BODY MASS INDEX: 24.78 KG/M2 | SYSTOLIC BLOOD PRESSURE: 135 MMHG | HEIGHT: 72 IN | TEMPERATURE: 97.3 F

## 2024-04-01 DIAGNOSIS — H66.92 LEFT OTITIS MEDIA, UNSPECIFIED OTITIS MEDIA TYPE: Primary | ICD-10-CM

## 2024-04-01 PROCEDURE — 99282 EMERGENCY DEPT VISIT SF MDM: CPT

## 2024-04-01 RX ORDER — AMOXICILLIN AND CLAVULANATE POTASSIUM 875; 125 MG/1; MG/1
1 TABLET, FILM COATED ORAL 2 TIMES DAILY
Qty: 14 TABLET | Refills: 0 | Status: ACTIVE | OUTPATIENT
Start: 2024-04-01 | End: 2024-04-08

## 2024-04-01 RX ORDER — AMOXICILLIN AND CLAVULANATE POTASSIUM 875; 125 MG/1; MG/1
1 TABLET, FILM COATED ORAL ONCE
Status: DISCONTINUED | OUTPATIENT
Start: 2024-04-01 | End: 2024-04-01 | Stop reason: HOSPADM

## 2024-04-01 ASSESSMENT — FIBROSIS 4 INDEX: FIB4 SCORE: 3.7

## 2024-04-01 NOTE — ED TRIAGE NOTES
Chief Complaint   Patient presents with    Ear Pain     Left ear pain started 2 weeks ago. Pt saw his PCP and was told he has inflammation. Pt started on Debrox and nasal decongestant. [place] noticed increase pain in the left ear, pt suppose to drive to [place] today and is nervous about the pain      BP (!) 149/87   Pulse 90   Temp 36.2 °C (97.2 °F) (Temporal)   Resp 17   Ht 1.829 m (6')   Wt 83 kg (182 lb 15.7 oz)   SpO2 94%   BMI 24.82 kg/m²

## 2024-04-01 NOTE — ED PROVIDER NOTES
ED Provider Note    CHIEF COMPLAINT  Chief Complaint   Patient presents with    Ear Pain     Left ear pain started 2 weeks ago. Pt saw his PCP and was told he has inflammation. Pt started on Debrox and nasal decongestant. [place] noticed increase pain in the left ear, pt suppose to drive to [place] today and is nervous about the pain        EXTERNAL RECORDS REVIEWED      HPI/ROS  LIMITATION TO HISTORY   none  OUTSIDE HISTORIAN(S):  none    Andry Calles is a 78 y.o. male who presents with concerns of left ear pain.  He has had progressive pain over the past 2 weeks.  He noticed after going up to [facility] the pain became significantly worse.  He was supposed to fly to [place] but decided against that because of the altitude changes and where it might worsen his ear pain.  He was seen by his provider recently who examined his ear and prescribed Debrox.  He says this has not improved his symptoms.  Denies fevers.  No severe headaches.    PAST MEDICAL HISTORY   has a past medical history of Atrial flutter (HCC) and COVID.    SURGICAL HISTORY   has a past surgical history that includes other orthopedic surgery and other abdominal surgery.    FAMILY HISTORY  No family history on file.    SOCIAL HISTORY  Social History     Tobacco Use    Smoking status: Never    Smokeless tobacco: Never   Vaping Use    Vaping Use: Never used   Substance and Sexual Activity    Alcohol use: No    Drug use: No    Sexual activity: Not on file       CURRENT MEDICATIONS  Home Medications       Reviewed by Dionisio Rouse R.N. (Registered Nurse) on 04/01/24 at 0331  Med List Status: Partial     Medication Last Dose Status   AIRDUO DIGIHALER 113-14 MCG/ACT AEROSOL POWDER, BREATH ACTIVATED  Active   albuterol 108 (90 Base) MCG/ACT Aero Soln inhalation aerosol  Active                    ALLERGIES  Allergies   Allergen Reactions    Other Environmental Runny Nose and Itching     Hayfever       PHYSICAL EXAM  VITAL  SIGNS: /72   Pulse 92   Temp 36.3 °C (97.3 °F) (Temporal)   Resp 16   Ht 1.829 m (6')   Wt 83 kg (182 lb 15.7 oz)   SpO2 96%   BMI 24.82 kg/m²    Physical Exam  Vitals and nursing note reviewed.   Constitutional:       Appearance: Normal appearance.   HENT:      Head: Normocephalic.      Ears:      Comments: Left TM with diffuse erythema and bulging. No inflammation in canal. No external ear or facial edema. No tenderness at mastoid. Right TM normal.      Nose: Congestion present.      Mouth/Throat:      Mouth: Mucous membranes are moist.   Eyes:      Extraocular Movements: Extraocular movements intact.      Conjunctiva/sclera: Conjunctivae normal.      Pupils: Pupils are equal, round, and reactive to light.   Cardiovascular:      Rate and Rhythm: Normal rate and regular rhythm.   Pulmonary:      Effort: Pulmonary effort is normal.      Breath sounds: Normal breath sounds.   Musculoskeletal:      Cervical back: Normal range of motion. No rigidity.   Lymphadenopathy:      Cervical: No cervical adenopathy.   Neurological:      General: No focal deficit present.      Mental Status: He is alert.           COURSE & MEDICAL DECISION MAKING    ASSESSMENT, COURSE AND PLAN  Care Narrative: This is a emergent valuation of a 70-year-old man who over the past 2 weeks has had progressive left ear pain.  On exam there is erythema and bulging of the TM.  Ear canals normal.  No surrounding adenopathy.  No tenderness at mastoid.  Vitals are normal, no fever.  This appears consistent with otitis media.  Lower suspicion for underlying osteomyelitis.  He did ask if imaging would be necessary and given normal vitals, no external ear changes, no external bony tenderness I think the chances of having mastoiditis or osteonecrosis would be low.  Will start Augmentin treatment.  He says he has had some nasal congestion and I recommended using Flonase.  He asked for referral to ENT and I placed a referral to [facility]   If he has any worsening [facility] since there are ENT specialist on-call if needed.            PROBLEMS MANAGED  #Left otitis media    DISPOSITION AND DISCUSSIONS    FINAL DIAGNOSIS  1. Left otitis media, unspecified otitis media type Active          Electronically signed by: Cristobal Choe II, M.D., 4/1/2024 3:27 AM

## 2024-04-11 ENCOUNTER — HOSPITAL ENCOUNTER (EMERGENCY)
Facility: MEDICAL CENTER | Age: 79
End: 2024-04-11
Attending: EMERGENCY MEDICINE
Payer: MEDICARE

## 2024-04-11 VITALS
BODY MASS INDEX: 24.63 KG/M2 | TEMPERATURE: 98 F | WEIGHT: 181.88 LBS | RESPIRATION RATE: 18 BRPM | DIASTOLIC BLOOD PRESSURE: 86 MMHG | SYSTOLIC BLOOD PRESSURE: 152 MMHG | HEIGHT: 72 IN | OXYGEN SATURATION: 98 % | HEART RATE: 73 BPM

## 2024-04-11 DIAGNOSIS — H69.92 EUSTACHIAN TUBE DISORDER, LEFT: ICD-10-CM

## 2024-04-11 PROCEDURE — 99284 EMERGENCY DEPT VISIT MOD MDM: CPT

## 2024-04-11 RX ORDER — METHYLPREDNISOLONE 4 MG/1
TABLET ORAL
Qty: 1 EACH | Refills: 0 | Status: SHIPPED | OUTPATIENT
Start: 2024-04-11

## 2024-04-11 ASSESSMENT — FIBROSIS 4 INDEX: FIB4 SCORE: 3.74

## 2024-04-11 ASSESSMENT — PAIN DESCRIPTION - PAIN TYPE: TYPE: ACUTE PAIN

## 2024-04-11 NOTE — ED NOTES
ERP at bedside. Pt agrees with plan of care discussed by ERP. Jatin in low position, side rail up for pt safety. Call light within reach. Plan of care on-going

## 2024-04-11 NOTE — ED NOTES
"Pt complains of left ear pain. Seen for same on April 1st and placed on Augmentin however, ear still feels \"full\"  "

## 2024-04-11 NOTE — ED TRIAGE NOTES
Chief Complaint   Patient presents with    Ear Pain     Left ear pain started on April 1st. Was treated with Augmentin for 7 days. Pain has resolved by he still feels heaviness, a tickle and like there is water in his ear when he drives.

## 2024-04-11 NOTE — ED NOTES
Discharge instructions provided. Pt verbalized understanding of discharge instructions to follow up with ENT and PCP and to return to ER if condition worsens. Pt ambulated out of ER without difficulty.

## 2024-04-23 ENCOUNTER — HOSPITAL ENCOUNTER (EMERGENCY)
Facility: MEDICAL CENTER | Age: 79
End: 2024-04-23
Attending: EMERGENCY MEDICINE
Payer: MEDICARE

## 2024-04-23 VITALS
HEART RATE: 70 BPM | TEMPERATURE: 98.2 F | HEIGHT: 72 IN | DIASTOLIC BLOOD PRESSURE: 80 MMHG | BODY MASS INDEX: 24.24 KG/M2 | WEIGHT: 179 LBS | RESPIRATION RATE: 16 BRPM | OXYGEN SATURATION: 94 % | SYSTOLIC BLOOD PRESSURE: 134 MMHG

## 2024-04-23 DIAGNOSIS — I48.0 PAROXYSMAL ATRIAL FIBRILLATION (HCC): ICD-10-CM

## 2024-04-23 LAB
ANION GAP SERPL CALC-SCNC: 11 MMOL/L (ref 7–16)
APTT PPP: 29.4 SEC (ref 24.7–36)
BASOPHILS # BLD AUTO: 0.6 % (ref 0–1.8)
BASOPHILS # BLD: 0.04 K/UL (ref 0–0.12)
BUN SERPL-MCNC: 19 MG/DL (ref 8–22)
CALCIUM SERPL-MCNC: 8.7 MG/DL (ref 8.5–10.5)
CHLORIDE SERPL-SCNC: 109 MMOL/L (ref 96–112)
CO2 SERPL-SCNC: 22 MMOL/L (ref 20–33)
CREAT SERPL-MCNC: 0.7 MG/DL (ref 0.5–1.4)
EKG IMPRESSION: NORMAL
EKG IMPRESSION: NORMAL
EOSINOPHIL # BLD AUTO: 0.2 K/UL (ref 0–0.51)
EOSINOPHIL NFR BLD: 3.1 % (ref 0–6.9)
ERYTHROCYTE [DISTWIDTH] IN BLOOD BY AUTOMATED COUNT: 44 FL (ref 35.9–50)
GFR SERPLBLD CREATININE-BSD FMLA CKD-EPI: 94 ML/MIN/1.73 M 2
GLUCOSE SERPL-MCNC: 111 MG/DL (ref 65–99)
HCT VFR BLD AUTO: 46.8 % (ref 42–52)
HGB BLD-MCNC: 16.3 G/DL (ref 14–18)
IMM GRANULOCYTES # BLD AUTO: 0.05 K/UL (ref 0–0.11)
IMM GRANULOCYTES NFR BLD AUTO: 0.8 % (ref 0–0.9)
INR PPP: 1.05 (ref 0.87–1.13)
LYMPHOCYTES # BLD AUTO: 1.8 K/UL (ref 1–4.8)
LYMPHOCYTES NFR BLD: 27.6 % (ref 22–41)
MCH RBC QN AUTO: 31.9 PG (ref 27–33)
MCHC RBC AUTO-ENTMCNC: 34.8 G/DL (ref 32.3–36.5)
MCV RBC AUTO: 91.6 FL (ref 81.4–97.8)
MONOCYTES # BLD AUTO: 0.77 K/UL (ref 0–0.85)
MONOCYTES NFR BLD AUTO: 11.8 % (ref 0–13.4)
NEUTROPHILS # BLD AUTO: 3.67 K/UL (ref 1.82–7.42)
NEUTROPHILS NFR BLD: 56.1 % (ref 44–72)
NRBC # BLD AUTO: 0 K/UL
NRBC BLD-RTO: 0 /100 WBC (ref 0–0.2)
PLATELET # BLD AUTO: 113 K/UL (ref 164–446)
PMV BLD AUTO: 11.6 FL (ref 9–12.9)
POTASSIUM SERPL-SCNC: 3.9 MMOL/L (ref 3.6–5.5)
PROTHROMBIN TIME: 13.8 SEC (ref 12–14.6)
RBC # BLD AUTO: 5.11 M/UL (ref 4.7–6.1)
SODIUM SERPL-SCNC: 142 MMOL/L (ref 135–145)
TSH SERPL DL<=0.005 MIU/L-ACNC: 3.33 UIU/ML (ref 0.38–5.33)
WBC # BLD AUTO: 6.5 K/UL (ref 4.8–10.8)

## 2024-04-23 PROCEDURE — 700102 HCHG RX REV CODE 250 W/ 637 OVERRIDE(OP): Performed by: EMERGENCY MEDICINE

## 2024-04-23 PROCEDURE — A9270 NON-COVERED ITEM OR SERVICE: HCPCS | Performed by: EMERGENCY MEDICINE

## 2024-04-23 PROCEDURE — 99285 EMERGENCY DEPT VISIT HI MDM: CPT

## 2024-04-23 PROCEDURE — 93005 ELECTROCARDIOGRAM TRACING: CPT

## 2024-04-23 PROCEDURE — 85730 THROMBOPLASTIN TIME PARTIAL: CPT

## 2024-04-23 PROCEDURE — 85025 COMPLETE CBC W/AUTO DIFF WBC: CPT

## 2024-04-23 PROCEDURE — 84443 ASSAY THYROID STIM HORMONE: CPT

## 2024-04-23 PROCEDURE — 93005 ELECTROCARDIOGRAM TRACING: CPT | Performed by: EMERGENCY MEDICINE

## 2024-04-23 PROCEDURE — 85610 PROTHROMBIN TIME: CPT

## 2024-04-23 PROCEDURE — 80048 BASIC METABOLIC PNL TOTAL CA: CPT

## 2024-04-23 PROCEDURE — 36415 COLL VENOUS BLD VENIPUNCTURE: CPT

## 2024-04-23 RX ADMIN — APIXABAN 5 MG: 5 TABLET, FILM COATED ORAL at 04:37

## 2024-04-23 RX ADMIN — METOPROLOL TARTRATE 12.5 MG: 25 TABLET, FILM COATED ORAL at 04:37

## 2024-04-23 ASSESSMENT — CHA2DS2 SCORE
CHF OR LEFT VENTRICULAR DYSFUNCTION: NO
HYPERTENSION: NO
DIABETES: NO
CHA2D2S VASC SCORE: 2
AGE IN YEARS: 75+
VASCULAR DISEASE: NO
SEX: MALE
PRIOR STROKE OR TIA OR THROMBOEMBOLISM: NO

## 2024-04-23 ASSESSMENT — FIBROSIS 4 INDEX: FIB4 SCORE: 3.74

## 2024-04-23 NOTE — ED TRIAGE NOTES
Andry Calles   79 y.o.     Chief Complaint   Patient presents with    Palpitations     Pt BIB [facility] for new onset heart palpitations and A-fib. Pt states he's had 2 episodes of A-flutter in the past with cardioversion x 5 years ago.      A&O x 4. Pt denies chest pain at this time.     EMS interventions: ems states pt had an episode of SVT -180 for a couple of seconds but then converted back to a-fib RVR.     Vitals:    04/23/24 0355   BP: 119/81   Pulse: (!) 140   Resp: 16   Temp: 37.1 °C (98.8 °F)   SpO2: 96%

## 2024-04-23 NOTE — DISCHARGE INSTRUCTIONS
You were seen in the emerged part for a fast heart rate.  You had an EKG showing atrial fibrillation with rapid ventricular response.  Thankfully you cardioverted back to sinus rhythm on your own.  You are being started on metoprolol to help prevent you from going back into atrial fibrillation, as well as apixaban to help prevent stroke.    Is important to follow-up with a cardiologist.  They may ultimately recommend having a flecainide pill-in-the-pocket approach or potentially attempting an ablation.    Return to the emergency department or seek medical attention if you develop:  Chest pain, fast heart rate, difficulty breathing, weakness in any part of your body, black stools, bloody stools, injury to the head, any other new or concerning finding

## 2024-04-23 NOTE — ED PROVIDER NOTES
ED Provider Note    Scribed for Dr. Alicea by Yoav Castle. 4/23/2024,  4:02 AM.      CHIEF COMPLAINT  Chief Complaint   Patient presents with    Palpitations     Pt BIB [facility] for new onset heart palpitations and A-fib. Pt states he's had 2 episodes of A-flutter in the past with cardioversion x 5 years ago.      EXTERNAL RECORDS REVIEWED  None pertinent    HPI    LIMITATION TO HISTORY   Select: : None    Andry Calles is a 79 y.o. male who presents to the Emergency Department for palpitations onset earlier tonight. He states that he has had two episodes of A-flutter over 5 years ago where he was successfully cardioverted. He was recently seen for an ear infection, was treated successfully with Augmentin. However he was getting lightheaded for short amounts of time and was concerned that he could be in Afib. Otherwise than the lightheadedness he feels normal, he only knows he is in Afib by feeling his pulse. He is very active and will walk on his treadmill for one hour a day. Tonight he woke up at 2 AM and he had rapid heart rate, called EMS who took him here. EMS notes that he had an episode of SVT with a heart rate of 160-180, then converted back to Afib. He denies any other major medical history.    REVIEW OF SYSTEMS  See HPI for further details. All other systems are negative.     PAST MEDICAL HISTORY     Past Medical History:   Diagnosis Date    Atrial flutter (HCC)     Cardioversion x 2    COVID      SURGICAL HISTORY  Past Surgical History:   Procedure Laterality Date    OTHER ABDOMINAL SURGERY      OTHER ORTHOPEDIC SURGERY       FAMILY HISTORY  History reviewed. No pertinent family history.    SOCIAL HISTORY    reports that he has never smoked. He has never used smokeless tobacco. He reports that he does not drink alcohol and does not use drugs.    CURRENT MEDICATIONS  Home Medications       Reviewed by Su Molina R.N. (Registered Nurse) on 04/23/24 at 0357  Med List Status: Partial     Medication Last  Dose Status   AIRDUO DIGIHALER 113-14 MCG/ACT AEROSOL POWDER, BREATH ACTIVATED  Active   albuterol 108 (90 Base) MCG/ACT Aero Soln inhalation aerosol  Active   methylPREDNISolone (MEDROL DOSEPAK) 4 MG Tablet Therapy Pack  Active                  ALLERGIES  Allergies   Allergen Reactions    Other Environmental Runny Nose and Itching     Hayfever     PHYSICAL EXAM  VITAL SIGNS: /81   Pulse (!) 140   Temp 37.1 °C (98.8 °F) (Temporal)   Resp 16   Ht 1.829 m (6')   Wt 81.2 kg (179 lb)   SpO2 96%   BMI 24.28 kg/m²   Gen: Alert  HENT: ATNC  Eyes: Normal conjunctiva  Neck: trachea midline  Resp: no respiratory distress, CTAB  CV: No JVD, RRR, no m/r/g. Equal radial pulses  Abd: non-distended, non-tender  Ext: No deformities, no edema  Psych: normal mood  Neuro: speech fluent      DIAGNOSTIC STUDIES / PROCEDURES  EKG  I independently interpreted this EKG.  Results for orders placed or performed during the hospital encounter of 24   EKG (NOW)   Result Value Ref Range    Report       [facility] Emergency Dept.    Test Date:  2024  Pt Name:    TONIO BALDWIN     Department: ER  MRN:        [id number]                      Room:        01  Gender:     Male                         Technician: [id number]  :        1945                   Requested By:ER TRIAGE PROTOCOL  Order #:    [id number]                    Reading MD: Bud Alicea    Measurements  Intervals                                Axis  Rate:       153                          P:          0  AL:         0                            QRS:        106  QRSD:       100                          T:          51  QT:         312  QTc:        498    Interpretive Statements  Atrial fibrillation with rapid V-rate  Right axis deviation  Minimal ST depression, inferior leads  Compared to ECG 2023 14:34:18  Right-axis deviation now present  ST (T wave) deviation now present  Sinus rhythm no longer present  Incomplete right  bundle-branch block no longer present  Electronically Jaki d On 2024 04:04:47 PDT by Bud Diaz     EKG (NOW)   Result Value Ref Range    Report       [facility] Emergency Dept.    Test Date:  2024  Pt Name:    TONIO BALDWIN     Department: ER  MRN:        [id number]                      Room:       [facility]  Gender:     Male                         Technician: [id number]  :        1945                   Requested By:BUD DIAZ  Order #:    [id number]                    Reading MD:    Measurements  Intervals                                Axis  Rate:       71                           P:          63  AL:         226                          QRS:        79  QRSD:       110                          T:          51  QT:         422  QTc:        459    Interpretive Statements  Incomplete analysis due to missing data in precordial lead(s)  Sinus rhythm  Prolonged AL interval  Borderline low voltage, extremity leads  Missing lead(s): V4  Compared to ECG 2024 03:50:33  First degree AV block now present  Atrial fibrillation no longer present  Right-axis deviation no longer present  ST (T wave) deviat ion no longer present       LABS  Labs Reviewed   CBC WITH DIFFERENTIAL - Abnormal; Notable for the following components:       Result Value    Platelet Count 113 (*)     All other components within normal limits   BASIC METABOLIC PANEL - Abnormal; Notable for the following components:    Glucose 111 (*)     All other components within normal limits   TSH WITH REFLEX TO FT4   ESTIMATED GFR   TSH WITH REFLEX TO FT4   PROTHROMBIN TIME   APTT     All labs reviewed by me.     COURSE & MEDICAL DECISION MAKING  Pertinent Labs & Imaging studies were reviewed. (See chart for details)    ED Observation Status? No, this patient does not meet criteria for ED Observation.   -----------    4:02 AM Patient seen and examined at bedside. He presents for Afib, did spontaneously convert back to  sinus here. Discussed plan of care including labs and EKG. Ordered CBC w/ diff, BMP, tsh with reflex and EKG to evaluate. I would like to start him on a low dose beta blocker to prevent Afib recurrences. I would also like to place him on Eliquis until he can be evaluated by a cardiologist. After discussion he is amenable to this plan of care. Trial doses of Eliquis 5 mg and metoprolol 12.5 mg given.     INITIAL ASSESSMENT AND PLAN  Medical Decision Making: Patient presents with rapid heart rate.  He regularly checks his pulse and this is new for him today.  He has had 2 prior episodes of what he believes were atrial flutter in the past.  He is not on chronic anticoagulation.  Denies any other medical problems.  He does have a cardiology appointment scheduled in [place] in the near future and will see them.  He spontaneously cardioverted before I was able to actually see the patient.  He is back in normal sinus rhythm.  Will start the patient on apixaban as his CHADS2 Vasc score is 2 with a 2.2% risk of stroke annually.  Will start him on low-dose metoprolol.    ADDITIONAL PROBLEM LIST AND DISPOSITION      Escalation of care considered, and ultimately not performed: diagnostic imaging and acute inpatient care management, however at this time, the patient is most appropriate for outpatient management.     Barriers to care at this time, including but not limited to: Patient does not have established PCP.     The patient was given return precautions, anticipatory guidance, and the opportunity to ask questions prior to discharge.         DISPOSITION:  Patient will be discharged home in stable condition.    FOLLOW UP:  [facility], Emergency Dept  [address] [address] [phone]    If symptoms worsen    Your regular doctor.  To establish a primary care provider within our system, please call [phone]    Schedule an appointment as soon as possible for a visit       [facility]  [place]  [address], [facility] [address]  [place] [address]  [phone]          OUTPATIENT MEDICATIONS:  New Prescriptions    APIXABAN (ELIQUIS) 5MG TAB    Take 1 Tablet by mouth 2 times a day.    METOPROLOL TARTRATE (LOPRESSOR) 25 MG TAB    Take 0.5 Tablets by mouth 2 times a day.       FINAL IMPRESSION  1. Paroxysmal atrial fibrillation (HCC)        IYoav (Scribe), am scribing for, and in the presence of, Bud Alicea M.D..    Electronically signed by: Yoav Castle (Scribe), 4/23/2024    IBud M.D. personally performed the services described in this documentation, as scribed by Yoav Castle in my presence, and it is both accurate and complete.    The note accurately reflects work and decisions made by me.  Bud Alicea M.D.  4/23/2024  5:02 AM    This dictation was created using voice recognition software. The accuracy of the dictation is limited to the abilities of the software. I expect there may be some errors of grammar and possibly content. The nursing notes were reviewed and certain aspects of this information were incorporated into this note.

## 2024-04-24 ENCOUNTER — TELEPHONE (OUTPATIENT)
Dept: VASCULAR LAB | Facility: MEDICAL CENTER | Age: 79
End: 2024-04-24
Payer: MEDICARE

## 2024-04-24 NOTE — TELEPHONE ENCOUNTER
[facility]     Received anticoagulation referral from ED discharge team on 4-23-24.     Called patient to schedule an appt to establish care. No answer. LVM.    1st call     Insurance: Medicare  PCP: None  Locations to be seen: [facility]    If no response by 5-22-24 OR 2 no shows/cancellations, will remove from referral list     [facility] at [phone], fax [phone]    Zelalem Saldaña, PharmD, BCACP

## 2024-05-01 ENCOUNTER — TELEPHONE (OUTPATIENT)
Dept: VASCULAR LAB | Facility: MEDICAL CENTER | Age: 79
End: 2024-05-01
Payer: MEDICARE

## 2024-05-01 NOTE — TELEPHONE ENCOUNTER
[facility]     Received anticoagulation referral from ED discharge team on 4-23-24.     Called patient to schedule an appt to establish care. No answer. LVM.  Mychart message sent.     2nd call     Insurance: Medicare  PCP: None  Locations to be seen: [facility]     If no response by 5-22-24 OR 2 no shows/cancellations, will remove from referral list     [facility] at [phone], fax [phone]     Zelalem Saldaña, PharmD, BCACP

## 2024-05-15 ENCOUNTER — TELEPHONE (OUTPATIENT)
Dept: VASCULAR LAB | Facility: MEDICAL CENTER | Age: 79
End: 2024-05-15
Payer: MEDICARE

## 2024-05-15 NOTE — TELEPHONE ENCOUNTER
[facility] [place] [facility]     Received anticoagulation referral from ED discharge team on 4-23-24.     Called patient to schedule an appt to establish care. No answer. LVM.  [employer]hart message sent (5/1/24).     3rd call     Insurance: Medicare  PCP: None  Locations to be seen: [facility]     If no response by 5-22-24 OR 2 no shows/cancellations, will remove from referral list     [facility]tico[facility]/Pharmacotherapy Clinic at [phone], fax [phone]    Tonya CouchD

## 2024-05-22 ENCOUNTER — TELEPHONE (OUTPATIENT)
Dept: VASCULAR LAB | Facility: MEDICAL CENTER | Age: 79
End: 2024-05-22
Payer: MEDICARE

## 2024-05-22 NOTE — TELEPHONE ENCOUNTER
[facility] Heart and Vascular Health and Pharmacotherapy Programs     Received anticoagulation referral from ED discharge team on 4-23-24.     Called patient to schedule an appt to establish care. No answer. LVM.     4th call  [employer] message sent    Will await further contact from patient       If no response by 5-22-24 OR 2 no shows/cancellations, will remove from referral list     [facility]tico[facility]armacotherapy Clinic at [phone], fax [phone]    Zelalem Saldaña, PharmD, BCACP

## 2024-07-25 ENCOUNTER — APPOINTMENT (RX ONLY)
Dept: URBAN - METROPOLITAN AREA CLINIC 6 | Facility: CLINIC | Age: 79
Setting detail: DERMATOLOGY
End: 2024-07-25

## 2024-07-25 DIAGNOSIS — L82.1 OTHER SEBORRHEIC KERATOSIS: ICD-10-CM

## 2024-07-25 DIAGNOSIS — Z71.89 OTHER SPECIFIED COUNSELING: ICD-10-CM

## 2024-07-25 PROCEDURE — ? SUNSCREEN TREATMENT REGIMEN

## 2024-07-25 PROCEDURE — 99212 OFFICE O/P EST SF 10 MIN: CPT

## 2024-07-25 PROCEDURE — ? COUNSELING

## 2024-07-25 ASSESSMENT — LOCATION DETAILED DESCRIPTION DERM
LOCATION DETAILED: LEFT ULNAR DORSAL HAND
LOCATION DETAILED: RIGHT SUPERIOR MEDIAL MALAR CHEEK
LOCATION DETAILED: RIGHT RADIAL DORSAL HAND
LOCATION DETAILED: LEFT SUPERIOR LATERAL MALAR CHEEK
LOCATION DETAILED: LEFT INFERIOR FOREHEAD

## 2024-07-25 ASSESSMENT — LOCATION SIMPLE DESCRIPTION DERM
LOCATION SIMPLE: LEFT CHEEK
LOCATION SIMPLE: LEFT HAND
LOCATION SIMPLE: RIGHT HAND
LOCATION SIMPLE: RIGHT CHEEK
LOCATION SIMPLE: LEFT FOREHEAD

## 2024-07-25 ASSESSMENT — LOCATION ZONE DERM
LOCATION ZONE: FACE
LOCATION ZONE: HAND

## 2024-07-27 ENCOUNTER — APPOINTMENT (OUTPATIENT)
Dept: RADIOLOGY | Facility: MEDICAL CENTER | Age: 79
End: 2024-07-27
Attending: STUDENT IN AN ORGANIZED HEALTH CARE EDUCATION/TRAINING PROGRAM
Payer: MEDICARE

## 2024-07-27 ENCOUNTER — HOSPITAL ENCOUNTER (EMERGENCY)
Facility: MEDICAL CENTER | Age: 79
End: 2024-07-28
Attending: STUDENT IN AN ORGANIZED HEALTH CARE EDUCATION/TRAINING PROGRAM
Payer: MEDICARE

## 2024-07-27 VITALS
DIASTOLIC BLOOD PRESSURE: 100 MMHG | WEIGHT: 179 LBS | SYSTOLIC BLOOD PRESSURE: 144 MMHG | HEART RATE: 83 BPM | RESPIRATION RATE: 17 BRPM | HEIGHT: 72 IN | BODY MASS INDEX: 24.24 KG/M2 | TEMPERATURE: 97.3 F | OXYGEN SATURATION: 95 %

## 2024-07-27 DIAGNOSIS — R00.0 TACHYCARDIA: ICD-10-CM

## 2024-07-27 DIAGNOSIS — I10 PRIMARY HYPERTENSION: ICD-10-CM

## 2024-07-27 LAB
ALBUMIN SERPL BCP-MCNC: 4.1 G/DL (ref 3.2–4.9)
ALBUMIN/GLOB SERPL: 1.6 G/DL
ALP SERPL-CCNC: 102 U/L (ref 30–99)
ALT SERPL-CCNC: 20 U/L (ref 2–50)
ANION GAP SERPL CALC-SCNC: 13 MMOL/L (ref 7–16)
AST SERPL-CCNC: 24 U/L (ref 12–45)
BASOPHILS # BLD AUTO: 0.4 % (ref 0–1.8)
BASOPHILS # BLD: 0.03 K/UL (ref 0–0.12)
BILIRUB SERPL-MCNC: 0.4 MG/DL (ref 0.1–1.5)
BUN SERPL-MCNC: 19 MG/DL (ref 8–22)
CALCIUM ALBUM COR SERPL-MCNC: 9.5 MG/DL (ref 8.5–10.5)
CALCIUM SERPL-MCNC: 9.6 MG/DL (ref 8.5–10.5)
CHLORIDE SERPL-SCNC: 105 MMOL/L (ref 96–112)
CO2 SERPL-SCNC: 24 MMOL/L (ref 20–33)
CREAT SERPL-MCNC: 0.83 MG/DL (ref 0.5–1.4)
EKG IMPRESSION: NORMAL
EKG IMPRESSION: NORMAL
EOSINOPHIL # BLD AUTO: 0.18 K/UL (ref 0–0.51)
EOSINOPHIL NFR BLD: 2.7 % (ref 0–6.9)
ERYTHROCYTE [DISTWIDTH] IN BLOOD BY AUTOMATED COUNT: 45.9 FL (ref 35.9–50)
GFR SERPLBLD CREATININE-BSD FMLA CKD-EPI: 89 ML/MIN/1.73 M 2
GLOBULIN SER CALC-MCNC: 2.6 G/DL (ref 1.9–3.5)
GLUCOSE SERPL-MCNC: 130 MG/DL (ref 65–99)
HCT VFR BLD AUTO: 49 % (ref 42–52)
HGB BLD-MCNC: 16.5 G/DL (ref 14–18)
IMM GRANULOCYTES # BLD AUTO: 0.02 K/UL (ref 0–0.11)
IMM GRANULOCYTES NFR BLD AUTO: 0.3 % (ref 0–0.9)
LYMPHOCYTES # BLD AUTO: 2.22 K/UL (ref 1–4.8)
LYMPHOCYTES NFR BLD: 33.2 % (ref 22–41)
MCH RBC QN AUTO: 32 PG (ref 27–33)
MCHC RBC AUTO-ENTMCNC: 33.7 G/DL (ref 32.3–36.5)
MCV RBC AUTO: 95.1 FL (ref 81.4–97.8)
MONOCYTES # BLD AUTO: 0.73 K/UL (ref 0–0.85)
MONOCYTES NFR BLD AUTO: 10.9 % (ref 0–13.4)
NEUTROPHILS # BLD AUTO: 3.5 K/UL (ref 1.82–7.42)
NEUTROPHILS NFR BLD: 52.5 % (ref 44–72)
NRBC # BLD AUTO: 0 K/UL
NRBC BLD-RTO: 0 /100 WBC (ref 0–0.2)
PLATELET # BLD AUTO: 132 K/UL (ref 164–446)
PMV BLD AUTO: 11.6 FL (ref 9–12.9)
POTASSIUM SERPL-SCNC: 4.1 MMOL/L (ref 3.6–5.5)
PROT SERPL-MCNC: 6.7 G/DL (ref 6–8.2)
RBC # BLD AUTO: 5.15 M/UL (ref 4.7–6.1)
SODIUM SERPL-SCNC: 142 MMOL/L (ref 135–145)
TROPONIN T SERPL-MCNC: 11 NG/L (ref 6–19)
WBC # BLD AUTO: 6.7 K/UL (ref 4.8–10.8)

## 2024-07-27 PROCEDURE — 99284 EMERGENCY DEPT VISIT MOD MDM: CPT

## 2024-07-27 PROCEDURE — 71045 X-RAY EXAM CHEST 1 VIEW: CPT

## 2024-07-27 PROCEDURE — 85025 COMPLETE CBC W/AUTO DIFF WBC: CPT

## 2024-07-27 PROCEDURE — 93005 ELECTROCARDIOGRAM TRACING: CPT

## 2024-07-27 PROCEDURE — 80053 COMPREHEN METABOLIC PANEL: CPT

## 2024-07-27 PROCEDURE — 84484 ASSAY OF TROPONIN QUANT: CPT

## 2024-07-27 PROCEDURE — 93005 ELECTROCARDIOGRAM TRACING: CPT | Performed by: STUDENT IN AN ORGANIZED HEALTH CARE EDUCATION/TRAINING PROGRAM

## 2024-07-27 PROCEDURE — 36415 COLL VENOUS BLD VENIPUNCTURE: CPT

## 2024-07-27 ASSESSMENT — PAIN DESCRIPTION - PAIN TYPE: TYPE: ACUTE PAIN

## 2024-07-27 ASSESSMENT — FIBROSIS 4 INDEX: FIB4 SCORE: 4.01

## 2024-07-28 NOTE — ED PROVIDER NOTES
ED Provider Note    CHIEF COMPLAINT  Chief Complaint   Patient presents with    Rapid Heart Beat     HR in triage in the 130s. Denies cp, sob, n/v. Hx: afib on eliquis. Took 50 mg metoprolol an hour PTA.        EXTERNAL RECORDS REVIEWED  External ED Note ED visit a month ago where he was started on apixaban    HPI/ROS  LIMITATION TO HISTORY   Select: : None  OUTSIDE HISTORIAN(S):  None    Andry Calles is a 79 y.o. male who presents due to fast heart rate at home.  Patient reports a history of atrial flutter.  He has previously had an ablation.  He had a transient episode of tachyarrhythmia a month ago and was seen here.  He was started on apixaban and metoprolol.  He has since followed up with cardiology and had thorough testing.  He next would like to see an electrophysiologist given persistent tachycardia symptoms.  Currently he is having no chest pain, he is not in distress, no shortness of breath, no recent fevers.  He was laying down to go to bed when he felt his pulse and his heart rate was fast.  He took additional metoprolol and presented for evaluation.    PAST MEDICAL HISTORY   has a past medical history of Atrial flutter (HCC) and COVID.    SURGICAL HISTORY   has a past surgical history that includes other orthopedic surgery and other abdominal surgery.    FAMILY HISTORY  History reviewed. No pertinent family history.    SOCIAL HISTORY  Social History     Tobacco Use    Smoking status: Never    Smokeless tobacco: Never   Vaping Use    Vaping status: Never Used   Substance and Sexual Activity    Alcohol use: No    Drug use: No    Sexual activity: Not on file       CURRENT MEDICATIONS  Home Medications       Reviewed by Namrata TURNER R.N. (Registered Nurse) on 07/27/24 at 2252  Med List Status: Partial     Medication Last Dose Status   AIRDUO DIGIHALER 113-14 MCG/ACT AEROSOL POWDER, BREATH ACTIVATED  Active   albuterol 108 (90 Base) MCG/ACT Aero Soln inhalation aerosol  Active   apixaban  (ELIQUIS) 5mg Tab  Active   methylPREDNISolone (MEDROL DOSEPAK) 4 MG Tablet Therapy Pack  Active   metoprolol tartrate (LOPRESSOR) 25 MG Tab  Active                    ALLERGIES  Allergies   Allergen Reactions    Other Environmental Runny Nose and Itching     Hayfever       PHYSICAL EXAM  VITAL SIGNS: BP (!) 144/100   Pulse 83   Temp 36.3 °C (97.3 °F) (Temporal)   Resp 17   Ht 1.829 m (6')   Wt 81.2 kg (179 lb)   SpO2 95%   BMI 24.28 kg/m²    Constitutional: Awake and alert. No acute distress.  Head: NCAT.  HEENT: Normal Conjunctiva. PERRLA.  Neck: Grossly normal range of motion. Airway midline.  Cardiovascular: Normal heart rate, Normal rhythm.  Thorax & Lungs: No respiratory distress. Clear to Auscultation bilaterally.  Abdomen: Normal inspection. Nontender. Nondistended  Skin: No obvious rash.  Back: No tenderness, No CVA tenderness.   Musculoskeletal: No obvious deformity. Moves all extremities Well.  Neurologic: A&Ox3.   Psychiatric: Mood and affect are appropriate for situation.    EKG/LABS  Results for orders placed or performed during the hospital encounter of 07/27/24   CBC with Differential   Result Value Ref Range    WBC 6.7 4.8 - 10.8 K/uL    RBC 5.15 4.70 - 6.10 M/uL    Hemoglobin 16.5 14.0 - 18.0 g/dL    Hematocrit 49.0 42.0 - 52.0 %    MCV 95.1 81.4 - 97.8 fL    MCH 32.0 27.0 - 33.0 pg    MCHC 33.7 32.3 - 36.5 g/dL    RDW 45.9 35.9 - 50.0 fL    Platelet Count 132 (L) 164 - 446 K/uL    MPV 11.6 9.0 - 12.9 fL    Neutrophils-Polys 52.50 44.00 - 72.00 %    Lymphocytes 33.20 22.00 - 41.00 %    Monocytes 10.90 0.00 - 13.40 %    Eosinophils 2.70 0.00 - 6.90 %    Basophils 0.40 0.00 - 1.80 %    Immature Granulocytes 0.30 0.00 - 0.90 %    Nucleated RBC 0.00 0.00 - 0.20 /100 WBC    Neutrophils (Absolute) 3.50 1.82 - 7.42 K/uL    Lymphs (Absolute) 2.22 1.00 - 4.80 K/uL    Monos (Absolute) 0.73 0.00 - 0.85 K/uL    Eos (Absolute) 0.18 0.00 - 0.51 K/uL    Baso (Absolute) 0.03 0.00 - 0.12 K/uL    Immature  Granulocytes (abs) 0.02 0.00 - 0.11 K/uL    NRBC (Absolute) 0.00 K/uL   Complete Metabolic Panel (CMP)   Result Value Ref Range    Sodium 142 135 - 145 mmol/L    Potassium 4.1 3.6 - 5.5 mmol/L    Chloride 105 96 - 112 mmol/L    Co2 24 20 - 33 mmol/L    Anion Gap 13.0 7.0 - 16.0    Glucose 130 (H) 65 - 99 mg/dL    Bun 19 8 - 22 mg/dL    Creatinine 0.83 0.50 - 1.40 mg/dL    Calcium 9.6 8.5 - 10.5 mg/dL    Correct Calcium 9.5 8.5 - 10.5 mg/dL    AST(SGOT) 24 12 - 45 U/L    ALT(SGPT) 20 2 - 50 U/L    Alkaline Phosphatase 102 (H) 30 - 99 U/L    Total Bilirubin 0.4 0.1 - 1.5 mg/dL    Albumin 4.1 3.2 - 4.9 g/dL    Total Protein 6.7 6.0 - 8.2 g/dL    Globulin 2.6 1.9 - 3.5 g/dL    A-G Ratio 1.6 g/dL   Troponins NOW   Result Value Ref Range    Troponin T 11 6 - 19 ng/L   ESTIMATED GFR   Result Value Ref Range    GFR (CKD-EPI) 89 >60 mL/min/1.73 m 2   EKG   Result Value Ref Range    Report       [facility] Emergency Dept.    Test Date:  2024  Pt Name:    TONIO BALDWIN     Department: ER  MRN:        [id number]                      Room:  Gender:     Male                         Technician: [id number]  :        1945                   Requested By:ER TRIAGE PROTOCOL  Order #:    [id number]                    Reading MD:    Measurements  Intervals                                Axis  Rate:       122                          P:          99  CT:         106                          QRS:        70  QRSD:       104                          T:          40  QT:         348  QTc:        496    Interpretive Statements  Sinus tachycardia  ST depression, consider ischemia, diffuse lds  Borderline prolonged QT interval  Artifact in lead(s) I,II,aVR,aVL,aVF  Compared to ECG 2024 04:52:10  ST (T wave) deviation now present  Possible ischemia now present  Sinus rhythm no longer present  First degree AV block no longer present     EKG   Result Value Ref Range    Report       [facility]  [place] Emergency Dept.    Test Date:  2024  Pt Name:    TONIO BALDWIN     Department: ER  MRN:        [id number]                      Room:       RD 01  Gender:     Male                         Technician: [id number]  :        1945                   Requested By:ARIANA KENNEDY  Order #:    [id number]                    Reading MD:    Measurements  Intervals                                Axis  Rate:       72                           P:          43  FL:         238                          QRS:        85  QRSD:       111                          T:          48  QT:         408  QTc:        447    Interpretive Statements  Sinus rhythm  Prolonged FL interval  Borderline right axis deviation  Compared to ECG 2024 22:50:11  First degree AV block now present  Sinus tachycardia no longer present  ST (T wave) deviation no longer present  Possible ischemia no longer present       Initial EKG sinus tachycardia rate of 122 bpm.  I do not appreciate a flutter or A-fib.  There are question of depressions on this EKG    Repeat EKG when heart is slowed with a rate of 72 bpm and sinus rhythm.  No acute ST or T wave changes.  I have independently interpreted this EKG    RADIOLOGY/PROCEDURES   I have independently interpreted the diagnostic imaging associated with this visit and am waiting the final reading from the radiologist.   My preliminary interpretation is as follows: no opacity or effusion    Radiologist interpretation:  DX-CHEST-PORTABLE (1 VIEW)   Final Result         1.  Left basilar atelectasis, no focal infiltrate          COURSE & MEDICAL DECISION MAKING    ASSESSMENT, COURSE AND PLAN  Care Narrative:   79-year-old male history of prior atrial flutter having undergone ablation in the past presents for fast heart rate at home  Afebrile and hypertensive  On exam no murmur, clear lungs, no respiratory distress.  EKG initial with sinus tachycardia.  At the time my assessment his heart rate is  in the 80s.  Repeat EKG is sinus rhythm without acute features.  Labs were obtained and not suggestive of an insidious process.  His electrolytes are normal, his troponin is normal.  Discussed follow-up with cardiology again.  Continue compliance with apixaban and continue metoprolol.  Discussed that if he has recurrent symptoms at home he can take his metoprolol and attempt to wait it out.  If he has sustained heart rates 150 or greater he is advised to present for evaluation.      ADDITIONAL PROBLEMS MANAGED  hypertension    DISPOSITION AND DISCUSSIONS  I have discussed management of the patient with the following physicians and GHISLAINE's:  none    Discussion of management with other [facility] or appropriate source(s): None     Escalation of care considered, and ultimately not performed:Laboratory analysis, diagnostic imaging, and acute inpatient care management, however at this time, the patient is most appropriate for outpatient management    Barriers to care at this time, including but not limited to: Patient does not have established PCP.     Decision tools and prescription drugs considered including, but not limited to:  None .    FINAL DIAGNOSIS  1. Tachycardia    2. Primary hypertension           Electronically signed by: Arielle Poe D.O., 7/27/2024 11:16 PM

## 2024-07-28 NOTE — ED TRIAGE NOTES
Chief Complaint   Patient presents with    Rapid Heart Beat     HR in triage in the 130s. Denies cp, sob, n/v. Hx: afib on eliquis. Took 50 mg metoprolol an hour PTA.        Pt ambulatory to triage. Pt A&Ox4.       Pt to EKG room. Pt educated on alerting staff in changes to condition. Pt verbalized understanding. Protocol ordered.     BP (!) 154/101   Pulse (!) 130   Temp 36.3 °C (97.3 °F) (Temporal)   Resp 16   Ht 1.829 m (6')   Wt 81.2 kg (179 lb)   SpO2 96%   BMI 24.28 kg/m²

## 2024-07-28 NOTE — ED NOTES
PIV placed, blood drawn and sent to lab. Pt wheeled to RED , attached to the monitor. Fall precautions in place

## 2024-07-28 NOTE — ED NOTES
Pt is GCS 15, speaking in full sentences, follows commands and responds appropriately to questions. Resp are even and unlabored.     Pt in bed, connected to pulse ox, bp, and cardiac monitor. RN introduced self to pt and oriented pt  to room and call light.

## 2024-08-27 ENCOUNTER — APPOINTMENT (OUTPATIENT)
Dept: RADIOLOGY | Facility: MEDICAL CENTER | Age: 79
End: 2024-08-27
Attending: STUDENT IN AN ORGANIZED HEALTH CARE EDUCATION/TRAINING PROGRAM
Payer: MEDICARE

## 2024-08-27 ENCOUNTER — HOSPITAL ENCOUNTER (EMERGENCY)
Facility: MEDICAL CENTER | Age: 79
End: 2024-08-27
Attending: STUDENT IN AN ORGANIZED HEALTH CARE EDUCATION/TRAINING PROGRAM
Payer: MEDICARE

## 2024-08-27 VITALS
WEIGHT: 175 LBS | HEIGHT: 72 IN | SYSTOLIC BLOOD PRESSURE: 98 MMHG | TEMPERATURE: 97.4 F | DIASTOLIC BLOOD PRESSURE: 59 MMHG | RESPIRATION RATE: 13 BRPM | BODY MASS INDEX: 23.7 KG/M2 | HEART RATE: 54 BPM | OXYGEN SATURATION: 92 %

## 2024-08-27 DIAGNOSIS — I48.91 ATRIAL FIBRILLATION WITH RVR (HCC): ICD-10-CM

## 2024-08-27 LAB
ALBUMIN SERPL BCP-MCNC: 4 G/DL (ref 3.2–4.9)
ALBUMIN/GLOB SERPL: 1.6 G/DL
ALP SERPL-CCNC: 91 U/L (ref 30–99)
ALT SERPL-CCNC: 21 U/L (ref 2–50)
ANION GAP SERPL CALC-SCNC: 9 MMOL/L (ref 7–16)
AST SERPL-CCNC: 26 U/L (ref 12–45)
BASOPHILS # BLD AUTO: 0.7 % (ref 0–1.8)
BASOPHILS # BLD: 0.04 K/UL (ref 0–0.12)
BILIRUB SERPL-MCNC: 0.4 MG/DL (ref 0.1–1.5)
BUN SERPL-MCNC: 23 MG/DL (ref 8–22)
CALCIUM ALBUM COR SERPL-MCNC: 9.1 MG/DL (ref 8.5–10.5)
CALCIUM SERPL-MCNC: 9.1 MG/DL (ref 8.5–10.5)
CHLORIDE SERPL-SCNC: 110 MMOL/L (ref 96–112)
CO2 SERPL-SCNC: 22 MMOL/L (ref 20–33)
CREAT SERPL-MCNC: 0.87 MG/DL (ref 0.5–1.4)
EKG IMPRESSION: NORMAL
EKG IMPRESSION: NORMAL
EOSINOPHIL # BLD AUTO: 0.15 K/UL (ref 0–0.51)
EOSINOPHIL NFR BLD: 2.5 % (ref 0–6.9)
ERYTHROCYTE [DISTWIDTH] IN BLOOD BY AUTOMATED COUNT: 45.4 FL (ref 35.9–50)
GFR SERPLBLD CREATININE-BSD FMLA CKD-EPI: 88 ML/MIN/1.73 M 2
GLOBULIN SER CALC-MCNC: 2.5 G/DL (ref 1.9–3.5)
GLUCOSE SERPL-MCNC: 104 MG/DL (ref 65–99)
HCT VFR BLD AUTO: 47.9 % (ref 42–52)
HGB BLD-MCNC: 16.4 G/DL (ref 14–18)
IMM GRANULOCYTES # BLD AUTO: 0.02 K/UL (ref 0–0.11)
IMM GRANULOCYTES NFR BLD AUTO: 0.3 % (ref 0–0.9)
LYMPHOCYTES # BLD AUTO: 1.71 K/UL (ref 1–4.8)
LYMPHOCYTES NFR BLD: 28.3 % (ref 22–41)
MAGNESIUM SERPL-MCNC: 2.1 MG/DL (ref 1.5–2.5)
MCH RBC QN AUTO: 31.7 PG (ref 27–33)
MCHC RBC AUTO-ENTMCNC: 34.2 G/DL (ref 32.3–36.5)
MCV RBC AUTO: 92.5 FL (ref 81.4–97.8)
MONOCYTES # BLD AUTO: 0.83 K/UL (ref 0–0.85)
MONOCYTES NFR BLD AUTO: 13.7 % (ref 0–13.4)
NEUTROPHILS # BLD AUTO: 3.29 K/UL (ref 1.82–7.42)
NEUTROPHILS NFR BLD: 54.5 % (ref 44–72)
NRBC # BLD AUTO: 0 K/UL
NRBC BLD-RTO: 0 /100 WBC (ref 0–0.2)
PHOSPHATE SERPL-MCNC: 3.3 MG/DL (ref 2.5–4.5)
PLATELET # BLD AUTO: 123 K/UL (ref 164–446)
PMV BLD AUTO: 11.9 FL (ref 9–12.9)
POTASSIUM SERPL-SCNC: 4 MMOL/L (ref 3.6–5.5)
PROT SERPL-MCNC: 6.5 G/DL (ref 6–8.2)
RBC # BLD AUTO: 5.18 M/UL (ref 4.7–6.1)
SODIUM SERPL-SCNC: 141 MMOL/L (ref 135–145)
TROPONIN T SERPL-MCNC: 16 NG/L (ref 6–19)
WBC # BLD AUTO: 6 K/UL (ref 4.8–10.8)

## 2024-08-27 PROCEDURE — 71045 X-RAY EXAM CHEST 1 VIEW: CPT

## 2024-08-27 PROCEDURE — 84484 ASSAY OF TROPONIN QUANT: CPT

## 2024-08-27 PROCEDURE — 84100 ASSAY OF PHOSPHORUS: CPT

## 2024-08-27 PROCEDURE — 80053 COMPREHEN METABOLIC PANEL: CPT

## 2024-08-27 PROCEDURE — 93005 ELECTROCARDIOGRAM TRACING: CPT

## 2024-08-27 PROCEDURE — 93005 ELECTROCARDIOGRAM TRACING: CPT | Performed by: STUDENT IN AN ORGANIZED HEALTH CARE EDUCATION/TRAINING PROGRAM

## 2024-08-27 PROCEDURE — 99285 EMERGENCY DEPT VISIT HI MDM: CPT

## 2024-08-27 PROCEDURE — 83735 ASSAY OF MAGNESIUM: CPT

## 2024-08-27 PROCEDURE — 700101 HCHG RX REV CODE 250: Performed by: STUDENT IN AN ORGANIZED HEALTH CARE EDUCATION/TRAINING PROGRAM

## 2024-08-27 PROCEDURE — 36415 COLL VENOUS BLD VENIPUNCTURE: CPT

## 2024-08-27 PROCEDURE — 96374 THER/PROPH/DIAG INJ IV PUSH: CPT

## 2024-08-27 PROCEDURE — 85025 COMPLETE CBC W/AUTO DIFF WBC: CPT

## 2024-08-27 RX ORDER — METOPROLOL TARTRATE 1 MG/ML
5 INJECTION, SOLUTION INTRAVENOUS
Status: DISCONTINUED | OUTPATIENT
Start: 2024-08-27 | End: 2024-08-27 | Stop reason: HOSPADM

## 2024-08-27 RX ADMIN — METOPROLOL TARTRATE 5 MG: 5 INJECTION INTRAVENOUS at 03:54

## 2024-08-27 ASSESSMENT — FIBROSIS 4 INDEX: FIB4 SCORE: 3.21

## 2024-08-27 NOTE — DISCHARGE INSTRUCTIONS
You were seen in the emergency department for rapid atrial fibrillation    Your heart rate came down with a dose of IV metoprolol    We referred you to cardiology

## 2024-08-27 NOTE — ED NOTES
Taken patient from triage waiting room, wheeled into the room, alert/ oriented x 4.Verified patient identification.  Assumed patient care.   Placed on patient room. Changed clothes to hospital gown. Connected to cardiac monitor.   Given the call light and instructed to call for any assistance needed/ or concerns.   Bed on lowest position, side rails up, breaks locked. Awaiting for ERP.

## 2024-08-27 NOTE — ED TRIAGE NOTES
Andry Calles  79 y.o.  male  Chief Complaint   Patient presents with    Rapid Heart Beat     Patient arrived in triage states his having a rapid heart beat at 154 beats / min at home. Hx of Afib. Denies chest pain / SOB.

## 2024-08-27 NOTE — ED NOTES
Pt discharged home. GCS 15. IV discontinued and gauze placed, pt in possession of belongings. Pt provided discharge education and information pertaining to follow up appointments. Pt received copy of discharge instructions and verbalized understanding.     Vitals:    08/27/24 0524   BP: 98/59   Pulse: (!) 54   Resp: 13   Temp: 36.3 °C (97.4 °F)   SpO2: 92%

## 2024-08-27 NOTE — ED PROVIDER NOTES
ED Provider Note    CHIEF COMPLAINT  Chief Complaint   Patient presents with    Rapid Heart Beat     Patient arrived in triage states his having a rapid heart beat at 154 beats / min at home. Hx of Afib. Denies chest pain / SOB.        EXTERNAL RECORDS REVIEWED  Reviewed several internal Emergency Department notes presenting for rapid A-fib    HPI/ROS  LIMITATION TO HISTORY   Select: : None  OUTSIDE HISTORIAN(S):  none    Andry Calles is a 79 y.o. male with a past medical history of paroxysmal atrial fibrillation presenting to the emergency department for tachycardia palpitations.  Says that he woke up in the middle of the night and his watch told him his heart rate was elevated, he could feel that he was in atrial fibrillation.  He denies any associated chest pain or shortness of breath.  Says that his atrial fibrillation is asymptomatic.     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  He took an extra dose of metoprolol 40 mg this evening when he realized his heart rate was still elevated.    Patient is originally from [facility]    PAST MEDICAL HISTORY   has a past medical history of Atrial flutter (HCC) and COVID.    SURGICAL HISTORY   has a past surgical history that includes other orthopedic surgery and other abdominal surgery.    FAMILY HISTORY  History reviewed. No pertinent family history.    SOCIAL HISTORY  Social History     Tobacco Use    Smoking status: Never    Smokeless tobacco: Never   Vaping Use    Vaping status: Never Used   Substance and Sexual Activity    Alcohol use: No    Drug use: No    Sexual activity: Not on file       CURRENT MEDICATIONS  Home Medications       Reviewed by Roberto Dumont R.N. (Registered Nurse) on 08/27/24 at 0306  Med List Status: Partial     Medication Last  Dose Status   AIRDUO DIGIHALER 113-14 MCG/ACT AEROSOL POWDER, BREATH ACTIVATED  Active   albuterol 108 (90 Base) MCG/ACT Aero Soln inhalation aerosol  Active   apixaban (ELIQUIS) 5mg Tab  Active   methylPREDNISolone (MEDROL DOSEPAK) 4 MG Tablet Therapy Pack  Active   metoprolol tartrate (LOPRESSOR) 25 MG Tab  Active                    ALLERGIES  Allergies   Allergen Reactions    Other Environmental Runny Nose and Itching     Hayfever       PHYSICAL EXAM  VITAL SIGNS: BP 98/59   Pulse (!) 54   Temp 36.3 °C (97.4 °F) (Temporal)   Resp 13   Ht 1.829 m (6')   Wt 79.4 kg (175 lb)   SpO2 92%   BMI 23.73 kg/m²    General: Well- appearing , non-toxic, no acute distress  Neuro: oriented x 3, moving all extremities.   HEENT:   - Head: Normocephalic, atraumatic  - Eyes: PERRL  - Ears/Nose: normal external nose and ears  - Mouth: moist mucosal membranes  Neck: No JVD  Resp: clear to auscultation, no increased work of breathing  CV: Tachycardic, irregular rhythm, no murmur  Abd: Soft, non-tender, non-distended  Extremities: No peripheral edema  Psych: lucid and conversational         DIAGNOSTIC STUDIES / PROCEDURES    EKG  My independent EKG interpretation:  Results for orders placed or performed during the hospital encounter of 24   EKG   Result Value Ref Range    Report       [facility] Emergency Dept.    Test Date:  2024  Pt Name:    TONIO BALDWIN     Department: ER  MRN:        [id number]                      Room:  Gender:     Male                         Technician: [id number]  :        1945                   Requested By:ER TRIAGE PROTOCOL  Order #:    [id number]                    Reading MD:    Measurements  Intervals                                Axis  Rate:       140                          P:          0  MD:         0                            QRS:        86  QRSD:       105                          T:          13  QT:         340  QTc:        519    Interpretive  Statements  Atrial fibrillation  Borderline right axis deviation  Prolonged QT interval  Compared to ECG 2024 23:43:42  Prolonged QT interval now present  Sinus rhythm no longer present  First degree AV block no longer present     EKG (NOW)   Result Value Ref Range    Report       [facility] Emergency Dept.    Test Date:  2024  Pt Name:    TONIO BALDWIN     Department: ER  MRN:        [id number]                      Room:        15  Gender:     Male                         Technician: [id number]  :        1945                   Requested By:DAVID JUSTICE  Order #:    [id number]                    Reading MD:    Measurements  Intervals                                Axis  Rate:       55                           P:          51  CA:         238                          QRS:        71  QRSD:       113                          T:          40  QT:         462  QTc:        442    Interpretive Statements  Sinus bradycardia  Prolonged CA interval  Borderline intraventricular conduction delay  Compared to ECG 2024 03:00:43  First degree AV block now present  Atrial fibrillation no longer present  Prolonged QT interval no longer present         LABS  Results for orders placed or performed during the hospital encounter of 24   CBC with Differential   Result Value Ref Range    WBC 6.0 4.8 - 10.8 K/uL    RBC 5.18 4.70 - 6.10 M/uL    Hemoglobin 16.4 14.0 - 18.0 g/dL    Hematocrit 47.9 42.0 - 52.0 %    MCV 92.5 81.4 - 97.8 fL    MCH 31.7 27.0 - 33.0 pg    MCHC 34.2 32.3 - 36.5 g/dL    RDW 45.4 35.9 - 50.0 fL    Platelet Count 123 (L) 164 - 446 K/uL    MPV 11.9 9.0 - 12.9 fL    Neutrophils-Polys 54.50 44.00 - 72.00 %    Lymphocytes 28.30 22.00 - 41.00 %    Monocytes 13.70 (H) 0.00 - 13.40 %    Eosinophils 2.50 0.00 - 6.90 %    Basophils 0.70 0.00 - 1.80 %    Immature Granulocytes 0.30 0.00 - 0.90 %    Nucleated RBC 0.00 0.00 - 0.20 /100 WBC    Neutrophils (Absolute) 3.29  1.82 - 7.42 K/uL    Lymphs (Absolute) 1.71 1.00 - 4.80 K/uL    Monos (Absolute) 0.83 0.00 - 0.85 K/uL    Eos (Absolute) 0.15 0.00 - 0.51 K/uL    Baso (Absolute) 0.04 0.00 - 0.12 K/uL    Immature Granulocytes (abs) 0.02 0.00 - 0.11 K/uL    NRBC (Absolute) 0.00 K/uL   Complete Metabolic Panel (CMP)   Result Value Ref Range    Sodium 141 135 - 145 mmol/L    Potassium 4.0 3.6 - 5.5 mmol/L    Chloride 110 96 - 112 mmol/L    Co2 22 20 - 33 mmol/L    Anion Gap 9.0 7.0 - 16.0    Glucose 104 (H) 65 - 99 mg/dL    Bun 23 (H) 8 - 22 mg/dL    Creatinine 0.87 0.50 - 1.40 mg/dL    Calcium 9.1 8.5 - 10.5 mg/dL    Correct Calcium 9.1 8.5 - 10.5 mg/dL    AST(SGOT) 26 12 - 45 U/L    ALT(SGPT) 21 2 - 50 U/L    Alkaline Phosphatase 91 30 - 99 U/L    Total Bilirubin 0.4 0.1 - 1.5 mg/dL    Albumin 4.0 3.2 - 4.9 g/dL    Total Protein 6.5 6.0 - 8.2 g/dL    Globulin 2.5 1.9 - 3.5 g/dL    A-G Ratio 1.6 g/dL   Troponins NOW   Result Value Ref Range    Troponin T 16 6 - 19 ng/L   ESTIMATED GFR   Result Value Ref Range    GFR (CKD-EPI) 88 >60 mL/min/1.73 m 2   PHOSPHORUS   Result Value Ref Range    Phosphorus 3.3 2.5 - 4.5 mg/dL   MAGNESIUM   Result Value Ref Range    Magnesium 2.1 1.5 - 2.5 mg/dL   EKG   Result Value Ref Range    Report       [facility] Emergency Dept.    Test Date:  2024  Pt Name:    TONIO BALDWIN     Department: ER  MRN:        [id number]                      Room:  Gender:     Male                         Technician: [id number]  :        1945                   Requested By:ER TRIAGE PROTOCOL  Order #:    [id number]                    Leslie MD:    Measurements  Intervals                                Axis  Rate:       140                          P:          0  OR:         0                            QRS:        86  QRSD:       105                          T:          13  QT:         340  QTc:        519    Interpretive Statements  Atrial fibrillation  Borderline right axis  deviation  Prolonged QT interval  Compared to ECG 2024 23:43:42  Prolonged QT interval now present  Sinus rhythm no longer present  First degree AV block no longer present     EKG (NOW)   Result Value Ref Range    Report       [facility] Emergency Dept.    Test Date:  2024  Pt Name:    ANDRY CALLES     Department: ER  MRN:        [id number]                      Room:       BL 15  Gender:     Male                         Technician: [id number]  :        1945                   Requested By:DAVID JUSTICE  Order #:    [id number]                    Reading MD:    Measurements  Intervals                                Axis  Rate:       55                           P:          51  ME:         238                          QRS:        71  QRSD:       113                          T:          40  QT:         462  QTc:        442    Interpretive Statements  Sinus bradycardia  Prolonged ME interval  Borderline intraventricular conduction delay  Compared to ECG 2024 03:00:43  First degree AV block now present  Atrial fibrillation no longer present  Prolonged QT interval no longer present         RADIOLOGY  I have independently interpreted the diagnostic imaging associated with this visit and am waiting the final reading from the radiologist.   My preliminary interpretation is as follows:   - Plain film of the chest shows no evidence of acute cardiopulmonary process  Radiologist interpretation:   DX-CHEST-PORTABLE (1 VIEW)   Final Result         1.  No acute cardiopulmonary disease.   2.  Atherosclerosis              MEDICAL DECISION MAKING      ED COURSE AND PLAN    Andry Calles is a 79 y.o. male presenting to the emergency department for evaluation of rapid atrial fibrillation.  Patient is asymptomatic no chest pain shortness of breath.  He took an extra dose of metoprolol 40 mg p.o. earlier this evening when he realized he was in rapid ventricular response.  He is  mentating well, his blood pressure is normal but his heart rate is persistently in the 130s to 140s.  EKG shows no ischemic changes.  Obtain baseline labs.  He was given initial dose of metoprolol 5 mg IV and he converted back to sinus bradycardia with a rate in the upper 50s.  His blood pressure maintained.  Electrolytes are unremarkable.  His troponin is negative.  He has no chest pain no indication for repeat troponin.  Repeat EKG shows no ischemic changes.  Patient has had difficulty getting into see cardiology in [place], I placed a referral for cardiology to see him to monitor his atrial fibrillation  Patient is appropriate for discharge home, strict return precautions discussed    ---Pertinent ED Course---:    8:05 AM I reviewed the patient's old records in Epic, medication list, allergies, past medical history and performed a physical examination.       Procedures:      ----------------------------------------------------------------------------------  DISCUSSIONS    I have discussed management of the patient with the following physicians and GHISLAINE's:      Discussion of management with other [facility] or appropriate source(s):     Escalation of care considered, and ultimately not performed: Considered but no indication for cardiology consultation in the emergency department.     Barriers to care at this time, including but not limited to:     Decision tools and prescription drugs considered including, but not limited to:     FINAL IMPRESSION    1. Atrial fibrillation with RVR ([facility])        Discharge Medication List as of 8/27/2024  5:27 AM            DISPOSITION    Discharge home, Stable      This chart was dictated using an electronic voice recognition software. The chart has been reviewed and edited but there is still possibility for dictation errors due to limitation of software.    Vikas Mcpherson, DO 8/27/2024

## 2024-09-04 ENCOUNTER — TELEPHONE (OUTPATIENT)
Dept: HEALTH INFORMATION MANAGEMENT | Facility: OTHER | Age: 79
End: 2024-09-04
Payer: MEDICARE

## 2024-09-18 ENCOUNTER — TELEPHONE (OUTPATIENT)
Dept: CARDIOLOGY | Facility: MEDICAL CENTER | Age: 79
End: 2024-09-18
Payer: MEDICARE

## 2024-09-18 NOTE — TELEPHONE ENCOUNTER
Chart Prep     Spoke to patient in regards to records for NP appointment with kimber cortes  on . Patient has never been treated by a cardiologist, all recent records in epic including labs, imaging and cardiac testing. Confirmed appointment date, time and location.

## 2024-10-04 ENCOUNTER — APPOINTMENT (OUTPATIENT)
Dept: CARDIOLOGY | Facility: MEDICAL CENTER | Age: 79
End: 2024-10-04
Attending: STUDENT IN AN ORGANIZED HEALTH CARE EDUCATION/TRAINING PROGRAM
Payer: MEDICARE

## 2024-10-16 ENCOUNTER — TELEPHONE (OUTPATIENT)
Dept: CARDIOLOGY | Facility: MEDICAL CENTER | Age: 79
End: 2024-10-16
Payer: MEDICARE

## 2024-10-21 ENCOUNTER — APPOINTMENT (OUTPATIENT)
Dept: CARDIOLOGY | Facility: MEDICAL CENTER | Age: 79
End: 2024-10-21
Attending: NURSE PRACTITIONER
Payer: MEDICARE

## 2025-01-27 ENCOUNTER — APPOINTMENT (OUTPATIENT)
Dept: URBAN - METROPOLITAN AREA CLINIC 6 | Facility: CLINIC | Age: 80
Setting detail: DERMATOLOGY
End: 2025-01-27

## 2025-01-27 DIAGNOSIS — L81.4 OTHER MELANIN HYPERPIGMENTATION: ICD-10-CM

## 2025-01-27 DIAGNOSIS — L82.1 OTHER SEBORRHEIC KERATOSIS: ICD-10-CM

## 2025-01-27 PROCEDURE — 99212 OFFICE O/P EST SF 10 MIN: CPT

## 2025-01-27 PROCEDURE — ? COUNSELING

## 2025-01-27 PROCEDURE — ? PHOTO-DOCUMENTATION

## 2025-01-27 ASSESSMENT — LOCATION SIMPLE DESCRIPTION DERM
LOCATION SIMPLE: LEFT CHEEK
LOCATION SIMPLE: RIGHT HAND
LOCATION SIMPLE: RIGHT INFERIOR EYELID
LOCATION SIMPLE: LEFT HAND

## 2025-01-27 ASSESSMENT — LOCATION DETAILED DESCRIPTION DERM
LOCATION DETAILED: RIGHT RADIAL DORSAL HAND
LOCATION DETAILED: RIGHT MEDIAL INFERIOR EYELID
LOCATION DETAILED: LEFT INFERIOR CENTRAL MALAR CHEEK
LOCATION DETAILED: LEFT ULNAR DORSAL HAND

## 2025-01-27 ASSESSMENT — LOCATION ZONE DERM
LOCATION ZONE: FACE
LOCATION ZONE: HAND
LOCATION ZONE: EYELID

## 2025-04-01 ENCOUNTER — APPOINTMENT (OUTPATIENT)
Dept: URBAN - METROPOLITAN AREA CLINIC 6 | Facility: CLINIC | Age: 80
Setting detail: DERMATOLOGY
End: 2025-04-01

## 2025-04-01 DIAGNOSIS — L43.8 OTHER LICHEN PLANUS: ICD-10-CM | Status: INADEQUATELY CONTROLLED

## 2025-04-01 PROBLEM — L30.9 DERMATITIS, UNSPECIFIED: Status: ACTIVE | Noted: 2025-04-01

## 2025-04-01 PROCEDURE — 99213 OFFICE O/P EST LOW 20 MIN: CPT

## 2025-04-01 PROCEDURE — ? PRESCRIPTION MEDICATION MANAGEMENT

## 2025-04-01 PROCEDURE — ? PRESCRIPTION

## 2025-04-01 PROCEDURE — ? COUNSELING

## 2025-04-01 RX ORDER — HYDROCORTISONE 25 MG/G
1 OINTMENT TOPICAL BID
Qty: 20 | Refills: 0 | Status: ERX | COMMUNITY
Start: 2025-04-01

## 2025-04-01 RX ORDER — MUPIROCIN 20 MG/G
OINTMENT TOPICAL BID
Qty: 22 | Refills: 0 | Status: ERX | COMMUNITY
Start: 2025-04-01

## 2025-04-01 RX ADMIN — HYDROCORTISONE 1: 25 OINTMENT TOPICAL at 00:00

## 2025-04-01 RX ADMIN — MUPIROCIN: 20 OINTMENT TOPICAL at 00:00

## 2025-04-01 ASSESSMENT — LOCATION DETAILED DESCRIPTION DERM: LOCATION DETAILED: DISTAL DORSAL PENILE SHAFT

## 2025-04-01 ASSESSMENT — LOCATION SIMPLE DESCRIPTION DERM: LOCATION SIMPLE: DORSAL PENIS

## 2025-04-01 ASSESSMENT — SEVERITY ASSESSMENT: SEVERITY: MILD

## 2025-04-01 ASSESSMENT — LOCATION ZONE DERM: LOCATION ZONE: PENIS

## 2025-04-01 NOTE — PROCEDURE: PRESCRIPTION MEDICATION MANAGEMENT
04-Sep-2024 11:52
Render In Strict Bullet Format?: No
Initiate Treatment: Hydrocortisone 2.5 % topical ointment Bid\\nMupirocin 2 % topical ointment Bid
Plan: If lesion does not resolve, will bx
Detail Level: Zone

## 2025-04-24 ENCOUNTER — APPOINTMENT (OUTPATIENT)
Dept: URBAN - METROPOLITAN AREA CLINIC 6 | Facility: CLINIC | Age: 80
Setting detail: DERMATOLOGY
End: 2025-04-24

## 2025-04-24 DIAGNOSIS — L43.8 OTHER LICHEN PLANUS: ICD-10-CM | Status: IMPROVED

## 2025-04-24 PROBLEM — L30.9 DERMATITIS, UNSPECIFIED: Status: ACTIVE | Noted: 2025-04-24

## 2025-04-24 PROCEDURE — ? COUNSELING

## 2025-04-24 PROCEDURE — 99213 OFFICE O/P EST LOW 20 MIN: CPT

## 2025-04-24 PROCEDURE — ? PRESCRIPTION MEDICATION MANAGEMENT

## 2025-04-24 ASSESSMENT — LOCATION SIMPLE DESCRIPTION DERM: LOCATION SIMPLE: DORSAL PENIS

## 2025-04-24 ASSESSMENT — LOCATION DETAILED DESCRIPTION DERM: LOCATION DETAILED: DISTAL DORSAL PENILE SHAFT

## 2025-04-24 ASSESSMENT — SEVERITY ASSESSMENT: SEVERITY: MILD

## 2025-04-24 ASSESSMENT — LOCATION ZONE DERM: LOCATION ZONE: PENIS

## 2025-04-24 NOTE — PROCEDURE: PRESCRIPTION MEDICATION MANAGEMENT
Plan: Recommended to use Vaseline Plan: Recommended to use Vaseline to AA\\nRTC if recurrence/needs bx

## 2025-04-24 NOTE — PROCEDURE: PRESCRIPTION MEDICATION MANAGEMENT
Continue Regimen: Hydrocortisone 2.5 % topical ointment- taper down on use, then discontinue Continue Regimen: Hydrocortisone 2.5 % topical ointment- taper down (daily x 1 more week, then discontinue)

## 2025-08-05 ENCOUNTER — APPOINTMENT (OUTPATIENT)
Dept: URBAN - METROPOLITAN AREA CLINIC 6 | Facility: CLINIC | Age: 80
Setting detail: DERMATOLOGY
End: 2025-08-05

## 2025-08-05 DIAGNOSIS — L57.0 ACTINIC KERATOSIS: ICD-10-CM

## 2025-08-05 DIAGNOSIS — Z71.89 OTHER SPECIFIED COUNSELING: ICD-10-CM

## 2025-08-05 DIAGNOSIS — D22 MELANOCYTIC NEVI: ICD-10-CM

## 2025-08-05 DIAGNOSIS — L82.1 OTHER SEBORRHEIC KERATOSIS: ICD-10-CM

## 2025-08-05 DIAGNOSIS — L81.4 OTHER MELANIN HYPERPIGMENTATION: ICD-10-CM

## 2025-08-05 DIAGNOSIS — L82.0 INFLAMED SEBORRHEIC KERATOSIS: ICD-10-CM

## 2025-08-05 PROBLEM — D22.61 MELANOCYTIC NEVI OF RIGHT UPPER LIMB, INCLUDING SHOULDER: Status: ACTIVE | Noted: 2025-08-05

## 2025-08-05 PROBLEM — D22.62 MELANOCYTIC NEVI OF LEFT UPPER LIMB, INCLUDING SHOULDER: Status: ACTIVE | Noted: 2025-08-05

## 2025-08-05 PROBLEM — D22.5 MELANOCYTIC NEVI OF TRUNK: Status: ACTIVE | Noted: 2025-08-05

## 2025-08-05 PROCEDURE — ? LIQUID NITROGEN

## 2025-08-05 PROCEDURE — ? COUNSELING

## 2025-08-05 ASSESSMENT — LOCATION DETAILED DESCRIPTION DERM
LOCATION DETAILED: XIPHOID
LOCATION DETAILED: RIGHT SUPERIOR FRONTAL SCALP
LOCATION DETAILED: LEFT INFERIOR FOREHEAD
LOCATION DETAILED: LEFT ANTERIOR PROXIMAL UPPER ARM
LOCATION DETAILED: LEFT INFERIOR CENTRAL MALAR CHEEK
LOCATION DETAILED: RIGHT SUPERIOR UPPER BACK
LOCATION DETAILED: EPIGASTRIC SKIN
LOCATION DETAILED: STERNUM
LOCATION DETAILED: LEFT ANTERIOR DISTAL UPPER ARM
LOCATION DETAILED: RIGHT ANTERIOR PROXIMAL UPPER ARM
LOCATION DETAILED: RIGHT ANTERIOR DISTAL UPPER ARM
LOCATION DETAILED: RIGHT INFERIOR CENTRAL MALAR CHEEK

## 2025-08-05 ASSESSMENT — LOCATION SIMPLE DESCRIPTION DERM
LOCATION SIMPLE: LEFT CHEEK
LOCATION SIMPLE: RIGHT UPPER BACK
LOCATION SIMPLE: LEFT UPPER ARM
LOCATION SIMPLE: RIGHT UPPER ARM
LOCATION SIMPLE: RIGHT CHEEK
LOCATION SIMPLE: ABDOMEN
LOCATION SIMPLE: SCALP
LOCATION SIMPLE: CHEST
LOCATION SIMPLE: LEFT FOREHEAD

## 2025-08-05 ASSESSMENT — LOCATION ZONE DERM
LOCATION ZONE: ARM
LOCATION ZONE: FACE
LOCATION ZONE: SCALP
LOCATION ZONE: TRUNK